# Patient Record
Sex: FEMALE | Race: WHITE | NOT HISPANIC OR LATINO | Employment: UNEMPLOYED | URBAN - METROPOLITAN AREA
[De-identification: names, ages, dates, MRNs, and addresses within clinical notes are randomized per-mention and may not be internally consistent; named-entity substitution may affect disease eponyms.]

---

## 2019-01-28 ENCOUNTER — APPOINTMENT (OUTPATIENT)
Dept: CT IMAGING | Facility: HOSPITAL | Age: 74
End: 2019-01-28

## 2019-01-28 ENCOUNTER — APPOINTMENT (OUTPATIENT)
Dept: GENERAL RADIOLOGY | Facility: HOSPITAL | Age: 74
End: 2019-01-28

## 2019-01-28 ENCOUNTER — HOSPITAL ENCOUNTER (INPATIENT)
Facility: HOSPITAL | Age: 74
LOS: 9 days | Discharge: HOME HEALTH | End: 2019-02-06
Attending: EMERGENCY MEDICINE | Admitting: INTERNAL MEDICINE

## 2019-01-28 DIAGNOSIS — I63.9 ACUTE CVA (CEREBROVASCULAR ACCIDENT) (HCC): Primary | ICD-10-CM

## 2019-01-28 DIAGNOSIS — R26.89 DECREASED MOBILITY: ICD-10-CM

## 2019-01-28 LAB
ABO GROUP BLD: NORMAL
ALBUMIN SERPL-MCNC: 4.2 G/DL (ref 3.5–5.2)
ALBUMIN/GLOB SERPL: 1.8 G/DL
ALP SERPL-CCNC: 78 U/L (ref 39–117)
ALT SERPL W P-5'-P-CCNC: 14 U/L (ref 1–33)
ANION GAP SERPL CALCULATED.3IONS-SCNC: 14.7 MMOL/L
APTT PPP: 26.1 SECONDS (ref 22.7–35.4)
AST SERPL-CCNC: 19 U/L (ref 1–32)
BASOPHILS # BLD AUTO: 0.05 10*3/MM3 (ref 0–0.2)
BASOPHILS NFR BLD AUTO: 0.8 % (ref 0–1.5)
BILIRUB SERPL-MCNC: 0.6 MG/DL (ref 0.1–1.2)
BLD GP AB SCN SERPL QL: NEGATIVE
BUN BLD-MCNC: 13 MG/DL (ref 8–23)
BUN/CREAT SERPL: 14 (ref 7–25)
CALCIUM SPEC-SCNC: 9.4 MG/DL (ref 8.6–10.5)
CHLORIDE SERPL-SCNC: 102 MMOL/L (ref 98–107)
CO2 SERPL-SCNC: 22.3 MMOL/L (ref 22–29)
CREAT BLD-MCNC: 0.93 MG/DL (ref 0.57–1)
DEPRECATED RDW RBC AUTO: 45.6 FL (ref 37–54)
EOSINOPHIL # BLD AUTO: 0.24 10*3/MM3 (ref 0–0.7)
EOSINOPHIL NFR BLD AUTO: 3.7 % (ref 0.3–6.2)
ERYTHROCYTE [DISTWIDTH] IN BLOOD BY AUTOMATED COUNT: 13.2 % (ref 11.7–13)
GFR SERPL CREATININE-BSD FRML MDRD: 59 ML/MIN/1.73
GFR SERPL CREATININE-BSD FRML MDRD: 71 ML/MIN/1.73
GLOBULIN UR ELPH-MCNC: 2.3 GM/DL
GLUCOSE BLD-MCNC: 115 MG/DL (ref 65–99)
GLUCOSE BLDC GLUCOMTR-MCNC: 112 MG/DL (ref 70–130)
GLUCOSE BLDC GLUCOMTR-MCNC: 131 MG/DL (ref 70–130)
GLUCOSE BLDC GLUCOMTR-MCNC: 138 MG/DL (ref 70–130)
HCT VFR BLD AUTO: 45.4 % (ref 35.6–45.5)
HGB BLD-MCNC: 16.1 G/DL (ref 11.9–15.5)
HOLD SPECIMEN: NORMAL
HOLD SPECIMEN: NORMAL
IMM GRANULOCYTES # BLD AUTO: 0.01 10*3/MM3 (ref 0–0.03)
IMM GRANULOCYTES NFR BLD AUTO: 0.2 % (ref 0–0.5)
INR PPP: 1.03 (ref 0.9–1.1)
LYMPHOCYTES # BLD AUTO: 1.71 10*3/MM3 (ref 0.9–4.8)
LYMPHOCYTES NFR BLD AUTO: 26.5 % (ref 19.6–45.3)
MCH RBC QN AUTO: 33.3 PG (ref 26.9–32)
MCHC RBC AUTO-ENTMCNC: 35.5 G/DL (ref 32.4–36.3)
MCV RBC AUTO: 93.8 FL (ref 80.5–98.2)
MONOCYTES # BLD AUTO: 0.88 10*3/MM3 (ref 0.2–1.2)
MONOCYTES NFR BLD AUTO: 13.6 % (ref 5–12)
NEUTROPHILS # BLD AUTO: 3.57 10*3/MM3 (ref 1.9–8.1)
NEUTROPHILS NFR BLD AUTO: 55.4 % (ref 42.7–76)
PLATELET # BLD AUTO: 244 10*3/MM3 (ref 140–500)
PMV BLD AUTO: 9.1 FL (ref 6–12)
POTASSIUM BLD-SCNC: 3.1 MMOL/L (ref 3.5–5.2)
PROT SERPL-MCNC: 6.5 G/DL (ref 6–8.5)
PROTHROMBIN TIME: 13.3 SECONDS (ref 11.7–14.2)
RBC # BLD AUTO: 4.84 10*6/MM3 (ref 3.9–5.2)
RH BLD: POSITIVE
SODIUM BLD-SCNC: 139 MMOL/L (ref 136–145)
T&S EXPIRATION DATE: NORMAL
TROPONIN T SERPL-MCNC: <0.01 NG/ML (ref 0–0.03)
WBC NRBC COR # BLD: 6.45 10*3/MM3 (ref 4.5–10.7)
WHOLE BLOOD HOLD SPECIMEN: NORMAL
WHOLE BLOOD HOLD SPECIMEN: NORMAL

## 2019-01-28 PROCEDURE — 86900 BLOOD TYPING SEROLOGIC ABO: CPT | Performed by: EMERGENCY MEDICINE

## 2019-01-28 PROCEDURE — 93005 ELECTROCARDIOGRAM TRACING: CPT | Performed by: EMERGENCY MEDICINE

## 2019-01-28 PROCEDURE — 93010 ELECTROCARDIOGRAM REPORT: CPT | Performed by: INTERNAL MEDICINE

## 2019-01-28 PROCEDURE — 84484 ASSAY OF TROPONIN QUANT: CPT | Performed by: EMERGENCY MEDICINE

## 2019-01-28 PROCEDURE — 82962 GLUCOSE BLOOD TEST: CPT

## 2019-01-28 PROCEDURE — 99285 EMERGENCY DEPT VISIT HI MDM: CPT

## 2019-01-28 PROCEDURE — 82565 ASSAY OF CREATININE: CPT

## 2019-01-28 PROCEDURE — 85610 PROTHROMBIN TIME: CPT | Performed by: EMERGENCY MEDICINE

## 2019-01-28 PROCEDURE — 85025 COMPLETE CBC W/AUTO DIFF WBC: CPT | Performed by: EMERGENCY MEDICINE

## 2019-01-28 PROCEDURE — 99291 CRITICAL CARE FIRST HOUR: CPT | Performed by: PSYCHIATRY & NEUROLOGY

## 2019-01-28 PROCEDURE — 86850 RBC ANTIBODY SCREEN: CPT | Performed by: EMERGENCY MEDICINE

## 2019-01-28 PROCEDURE — 86901 BLOOD TYPING SEROLOGIC RH(D): CPT | Performed by: EMERGENCY MEDICINE

## 2019-01-28 PROCEDURE — 25010000002 ALTEPLASE PER 1 MG: Performed by: PSYCHIATRY & NEUROLOGY

## 2019-01-28 PROCEDURE — 0042T HC CT CEREBRAL PERFUSION W/WO CONTRAST: CPT

## 2019-01-28 PROCEDURE — 80053 COMPREHEN METABOLIC PANEL: CPT | Performed by: EMERGENCY MEDICINE

## 2019-01-28 PROCEDURE — 71045 X-RAY EXAM CHEST 1 VIEW: CPT

## 2019-01-28 PROCEDURE — 85730 THROMBOPLASTIN TIME PARTIAL: CPT | Performed by: EMERGENCY MEDICINE

## 2019-01-28 PROCEDURE — 70498 CT ANGIOGRAPHY NECK: CPT

## 2019-01-28 PROCEDURE — 3E03317 INTRODUCTION OF OTHER THROMBOLYTIC INTO PERIPHERAL VEIN, PERCUTANEOUS APPROACH: ICD-10-PCS | Performed by: PSYCHIATRY & NEUROLOGY

## 2019-01-28 PROCEDURE — 25010000002 ALTEPLASE PER 1 MG

## 2019-01-28 PROCEDURE — 70496 CT ANGIOGRAPHY HEAD: CPT

## 2019-01-28 PROCEDURE — 0 IOPAMIDOL PER 1 ML: Performed by: EMERGENCY MEDICINE

## 2019-01-28 PROCEDURE — 84443 ASSAY THYROID STIM HORMONE: CPT | Performed by: NURSE PRACTITIONER

## 2019-01-28 RX ORDER — SODIUM CHLORIDE 0.9 % (FLUSH) 0.9 %
10 SYRINGE (ML) INJECTION AS NEEDED
Status: DISCONTINUED | OUTPATIENT
Start: 2019-01-28 | End: 2019-02-06 | Stop reason: HOSPADM

## 2019-01-28 RX ORDER — LEVOTHYROXINE SODIUM 0.12 MG/1
TABLET ORAL DAILY
COMMUNITY

## 2019-01-28 RX ADMIN — WATER 51.8 MG: 1 INJECTION INTRAMUSCULAR; INTRAVENOUS; SUBCUTANEOUS at 20:57

## 2019-01-28 RX ADMIN — IOPAMIDOL 150 ML: 755 INJECTION, SOLUTION INTRAVENOUS at 20:43

## 2019-01-29 ENCOUNTER — APPOINTMENT (OUTPATIENT)
Dept: MRI IMAGING | Facility: HOSPITAL | Age: 74
End: 2019-01-29

## 2019-01-29 ENCOUNTER — APPOINTMENT (OUTPATIENT)
Dept: GENERAL RADIOLOGY | Facility: HOSPITAL | Age: 74
End: 2019-01-29
Attending: PSYCHIATRY & NEUROLOGY

## 2019-01-29 ENCOUNTER — APPOINTMENT (OUTPATIENT)
Dept: CT IMAGING | Facility: HOSPITAL | Age: 74
End: 2019-01-29

## 2019-01-29 LAB
ANION GAP SERPL CALCULATED.3IONS-SCNC: 13.2 MMOL/L
BUN BLD-MCNC: 8 MG/DL (ref 8–23)
BUN/CREAT SERPL: 12.9 (ref 7–25)
CALCIUM SPEC-SCNC: 8.7 MG/DL (ref 8.6–10.5)
CHLORIDE SERPL-SCNC: 106 MMOL/L (ref 98–107)
CO2 SERPL-SCNC: 19.8 MMOL/L (ref 22–29)
CREAT BLD-MCNC: 0.62 MG/DL (ref 0.57–1)
GFR SERPL CREATININE-BSD FRML MDRD: 94 ML/MIN/1.73
GLUCOSE BLD-MCNC: 95 MG/DL (ref 65–99)
GLUCOSE BLDC GLUCOMTR-MCNC: 114 MG/DL (ref 70–130)
GLUCOSE BLDC GLUCOMTR-MCNC: 94 MG/DL (ref 70–130)
GLUCOSE BLDC GLUCOMTR-MCNC: 97 MG/DL (ref 70–130)
POTASSIUM BLD-SCNC: 3.5 MMOL/L (ref 3.5–5.2)
SODIUM BLD-SCNC: 139 MMOL/L (ref 136–145)
TSH SERPL DL<=0.05 MIU/L-ACNC: 1.12 MIU/ML (ref 0.27–4.2)
VIT B12 BLD-MCNC: 240 PG/ML (ref 211–946)

## 2019-01-29 PROCEDURE — 80048 BASIC METABOLIC PNL TOTAL CA: CPT | Performed by: INTERNAL MEDICINE

## 2019-01-29 PROCEDURE — 82607 VITAMIN B-12: CPT | Performed by: NURSE PRACTITIONER

## 2019-01-29 PROCEDURE — 70450 CT HEAD/BRAIN W/O DYE: CPT

## 2019-01-29 PROCEDURE — 82962 GLUCOSE BLOOD TEST: CPT

## 2019-01-29 PROCEDURE — 70551 MRI BRAIN STEM W/O DYE: CPT

## 2019-01-29 PROCEDURE — 99233 SBSQ HOSP IP/OBS HIGH 50: CPT | Performed by: NURSE PRACTITIONER

## 2019-01-29 PROCEDURE — 25010000003 POTASSIUM CHLORIDE 10 MEQ/100ML SOLUTION: Performed by: INTERNAL MEDICINE

## 2019-01-29 PROCEDURE — 72020 X-RAY EXAM OF SPINE 1 VIEW: CPT

## 2019-01-29 PROCEDURE — 92610 EVALUATE SWALLOWING FUNCTION: CPT | Performed by: SPEECH-LANGUAGE PATHOLOGIST

## 2019-01-29 RX ORDER — SODIUM CHLORIDE 0.9 % (FLUSH) 0.9 %
3 SYRINGE (ML) INJECTION EVERY 12 HOURS SCHEDULED
Status: DISCONTINUED | OUTPATIENT
Start: 2019-01-29 | End: 2019-01-30

## 2019-01-29 RX ORDER — ASPIRIN 325 MG
325 TABLET ORAL DAILY
Status: DISCONTINUED | OUTPATIENT
Start: 2019-01-30 | End: 2019-01-30

## 2019-01-29 RX ORDER — SODIUM CHLORIDE 9 MG/ML
75 INJECTION, SOLUTION INTRAVENOUS CONTINUOUS
Status: DISCONTINUED | OUTPATIENT
Start: 2019-01-29 | End: 2019-02-02

## 2019-01-29 RX ORDER — POTASSIUM CHLORIDE 7.45 MG/ML
10 INJECTION INTRAVENOUS
Status: DISCONTINUED | OUTPATIENT
Start: 2019-01-29 | End: 2019-02-06 | Stop reason: HOSPADM

## 2019-01-29 RX ORDER — SODIUM CHLORIDE 0.9 % (FLUSH) 0.9 %
3-10 SYRINGE (ML) INJECTION AS NEEDED
Status: DISCONTINUED | OUTPATIENT
Start: 2019-01-29 | End: 2019-02-06 | Stop reason: HOSPADM

## 2019-01-29 RX ORDER — ASPIRIN 300 MG/1
300 SUPPOSITORY RECTAL DAILY
Status: DISCONTINUED | OUTPATIENT
Start: 2019-01-30 | End: 2019-01-30

## 2019-01-29 RX ORDER — ATORVASTATIN CALCIUM 80 MG/1
80 TABLET, FILM COATED ORAL NIGHTLY
Status: DISCONTINUED | OUTPATIENT
Start: 2019-01-29 | End: 2019-01-30

## 2019-01-29 RX ORDER — NICOTINE 21 MG/24HR
1 PATCH, TRANSDERMAL 24 HOURS TRANSDERMAL
Status: DISCONTINUED | OUTPATIENT
Start: 2019-01-29 | End: 2019-02-06 | Stop reason: HOSPADM

## 2019-01-29 RX ADMIN — POTASSIUM CHLORIDE 10 MEQ: 7.46 INJECTION, SOLUTION INTRAVENOUS at 02:48

## 2019-01-29 RX ADMIN — NICOTINE 1 PATCH: 21 PATCH, EXTENDED RELEASE TRANSDERMAL at 22:08

## 2019-01-29 RX ADMIN — SODIUM CHLORIDE 5 MG/HR: 9 INJECTION, SOLUTION INTRAVENOUS at 01:12

## 2019-01-29 RX ADMIN — POTASSIUM CHLORIDE 10 MEQ: 7.46 INJECTION, SOLUTION INTRAVENOUS at 05:23

## 2019-01-29 RX ADMIN — POTASSIUM CHLORIDE 10 MEQ: 7.46 INJECTION, SOLUTION INTRAVENOUS at 04:01

## 2019-01-29 RX ADMIN — Medication 3 ML: at 09:59

## 2019-01-29 RX ADMIN — POTASSIUM CHLORIDE 10 MEQ: 7.46 INJECTION, SOLUTION INTRAVENOUS at 00:39

## 2019-01-29 RX ADMIN — POTASSIUM CHLORIDE 10 MEQ: 7.46 INJECTION, SOLUTION INTRAVENOUS at 06:36

## 2019-01-29 RX ADMIN — POTASSIUM CHLORIDE 10 MEQ: 7.46 INJECTION, SOLUTION INTRAVENOUS at 08:06

## 2019-01-29 RX ADMIN — SODIUM CHLORIDE 75 ML/HR: 9 INJECTION, SOLUTION INTRAVENOUS at 11:35

## 2019-01-29 RX ADMIN — Medication 3 ML: at 20:57

## 2019-01-29 RX ADMIN — SODIUM CHLORIDE 75 ML/HR: 9 INJECTION, SOLUTION INTRAVENOUS at 06:55

## 2019-01-30 ENCOUNTER — APPOINTMENT (OUTPATIENT)
Dept: CARDIOLOGY | Facility: HOSPITAL | Age: 74
End: 2019-01-30

## 2019-01-30 LAB
BH CV ECHO MEAS - ACS: 1.9 CM
BH CV ECHO MEAS - AO MEAN PG (FULL): 0 MMHG
BH CV ECHO MEAS - AO MEAN PG: 3 MMHG
BH CV ECHO MEAS - AO ROOT AREA (BSA CORRECTED): 1.6
BH CV ECHO MEAS - AO ROOT AREA: 6.6 CM^2
BH CV ECHO MEAS - AO ROOT DIAM: 2.9 CM
BH CV ECHO MEAS - AO V2 MEAN: 74.4 CM/SEC
BH CV ECHO MEAS - AO V2 VTI: 23.3 CM
BH CV ECHO MEAS - AVA(I,A): 4.3 CM^2
BH CV ECHO MEAS - AVA(I,D): 4.3 CM^2
BH CV ECHO MEAS - BSA(HAYCOCK): 1.8 M^2
BH CV ECHO MEAS - BSA: 1.8 M^2
BH CV ECHO MEAS - BZI_BMI: 20.2 KILOGRAMS/M^2
BH CV ECHO MEAS - BZI_METRIC_HEIGHT: 177.8 CM
BH CV ECHO MEAS - BZI_METRIC_WEIGHT: 64 KG
BH CV ECHO MEAS - EDV(CUBED): 46.7 ML
BH CV ECHO MEAS - EDV(MOD-SP2): 50 ML
BH CV ECHO MEAS - EDV(MOD-SP4): 59 ML
BH CV ECHO MEAS - EDV(TEICH): 54.4 ML
BH CV ECHO MEAS - EF(CUBED): 66.5 %
BH CV ECHO MEAS - EF(MOD-BP): 65 %
BH CV ECHO MEAS - EF(MOD-SP2): 68 %
BH CV ECHO MEAS - EF(MOD-SP4): 64.4 %
BH CV ECHO MEAS - EF(TEICH): 59 %
BH CV ECHO MEAS - ESV(CUBED): 15.6 ML
BH CV ECHO MEAS - ESV(MOD-SP2): 16 ML
BH CV ECHO MEAS - ESV(MOD-SP4): 21 ML
BH CV ECHO MEAS - ESV(TEICH): 22.3 ML
BH CV ECHO MEAS - FS: 30.6 %
BH CV ECHO MEAS - IVS/LVPW: 1
BH CV ECHO MEAS - IVSD: 1.3 CM
BH CV ECHO MEAS - LA DIMENSION: 4.3 CM
BH CV ECHO MEAS - LA/AO: 1.5
BH CV ECHO MEAS - LAT PEAK E' VEL: 7 CM/SEC
BH CV ECHO MEAS - LV DIASTOLIC VOL/BSA (35-75): 32.8 ML/M^2
BH CV ECHO MEAS - LV MASS(C)D: 160.1 GRAMS
BH CV ECHO MEAS - LV MASS(C)DI: 89 GRAMS/M^2
BH CV ECHO MEAS - LV MEAN PG: 3 MMHG
BH CV ECHO MEAS - LV SYSTOLIC VOL/BSA (12-30): 11.7 ML/M^2
BH CV ECHO MEAS - LV V1 MEAN: 79.9 CM/SEC
BH CV ECHO MEAS - LV V1 VTI: 28.9 CM
BH CV ECHO MEAS - LVIDD: 3.6 CM
BH CV ECHO MEAS - LVIDS: 2.5 CM
BH CV ECHO MEAS - LVLD AP2: 7.3 CM
BH CV ECHO MEAS - LVLD AP4: 7.4 CM
BH CV ECHO MEAS - LVLS AP2: 5.5 CM
BH CV ECHO MEAS - LVLS AP4: 6 CM
BH CV ECHO MEAS - LVOT AREA (M): 3.5 CM^2
BH CV ECHO MEAS - LVOT AREA: 3.5 CM^2
BH CV ECHO MEAS - LVOT DIAM: 2.1 CM
BH CV ECHO MEAS - LVPWD: 1.3 CM
BH CV ECHO MEAS - MED PEAK E' VEL: 6.5 CM/SEC
BH CV ECHO MEAS - MV A DUR: 0.21 SEC
BH CV ECHO MEAS - MV A MAX VEL: 130 CM/SEC
BH CV ECHO MEAS - MV DEC SLOPE: 238 CM/SEC^2
BH CV ECHO MEAS - MV DEC TIME: 0.22 SEC
BH CV ECHO MEAS - MV E MAX VEL: 81.9 CM/SEC
BH CV ECHO MEAS - MV E/A: 0.63
BH CV ECHO MEAS - MV MEAN PG: 3 MMHG
BH CV ECHO MEAS - MV P1/2T MAX VEL: 93.7 CM/SEC
BH CV ECHO MEAS - MV P1/2T: 115.3 MSEC
BH CV ECHO MEAS - MV V2 MEAN: 80.7 CM/SEC
BH CV ECHO MEAS - MV V2 VTI: 31.9 CM
BH CV ECHO MEAS - MVA P1/2T LCG: 2.3 CM^2
BH CV ECHO MEAS - MVA(P1/2T): 1.9 CM^2
BH CV ECHO MEAS - MVA(VTI): 3.1 CM^2
BH CV ECHO MEAS - PA ACC SLOPE: 694 CM/SEC^2
BH CV ECHO MEAS - PA ACC TIME: 0.12 SEC
BH CV ECHO MEAS - PA MAX PG (FULL): 1.5 MMHG
BH CV ECHO MEAS - PA MAX PG: 2.8 MMHG
BH CV ECHO MEAS - PA PR(ACCEL): 23.7 MMHG
BH CV ECHO MEAS - PA V2 MAX: 84.2 CM/SEC
BH CV ECHO MEAS - PULM A REVS DUR: 0.17 SEC
BH CV ECHO MEAS - PULM A REVS VEL: 26.7 CM/SEC
BH CV ECHO MEAS - PULM DIAS VEL: 32.6 CM/SEC
BH CV ECHO MEAS - PULM S/D: 1.7
BH CV ECHO MEAS - PULM SYS VEL: 55.8 CM/SEC
BH CV ECHO MEAS - PVA(V,A): 2.3 CM^2
BH CV ECHO MEAS - PVA(V,D): 2.3 CM^2
BH CV ECHO MEAS - QP/QS: 0.42
BH CV ECHO MEAS - RAP SYSTOLE: 3 MMHG
BH CV ECHO MEAS - RV MAX PG: 1.3 MMHG
BH CV ECHO MEAS - RV MEAN PG: 1 MMHG
BH CV ECHO MEAS - RV V1 MAX: 57.1 CM/SEC
BH CV ECHO MEAS - RV V1 MEAN: 41.9 CM/SEC
BH CV ECHO MEAS - RV V1 VTI: 12.1 CM
BH CV ECHO MEAS - RVOT AREA: 3.5 CM^2
BH CV ECHO MEAS - RVOT DIAM: 2.1 CM
BH CV ECHO MEAS - RVSP: 20 MMHG
BH CV ECHO MEAS - SI(AO): 85.5 ML/M^2
BH CV ECHO MEAS - SI(CUBED): 17.2 ML/M^2
BH CV ECHO MEAS - SI(LVOT): 55.6 ML/M^2
BH CV ECHO MEAS - SI(MOD-SP2): 18.9 ML/M^2
BH CV ECHO MEAS - SI(MOD-SP4): 21.1 ML/M^2
BH CV ECHO MEAS - SI(TEICH): 17.8 ML/M^2
BH CV ECHO MEAS - SV(AO): 153.9 ML
BH CV ECHO MEAS - SV(CUBED): 31 ML
BH CV ECHO MEAS - SV(LVOT): 100.1 ML
BH CV ECHO MEAS - SV(MOD-SP2): 34 ML
BH CV ECHO MEAS - SV(MOD-SP4): 38 ML
BH CV ECHO MEAS - SV(RVOT): 41.9 ML
BH CV ECHO MEAS - SV(TEICH): 32.1 ML
BH CV ECHO MEAS - TAPSE (>1.6): 2.1 CM2
BH CV ECHO MEAS - TR MAX VEL: 208 CM/SEC
BH CV ECHO MEASUREMENTS AVERAGE E/E' RATIO: 12.13
BH CV XLRA - RV BASE: 3.4 CM
BH CV XLRA - RV LENGTH: 6.2 CM
BH CV XLRA - RV MID: 2.6 CM
BH CV XLRA - TDI S': 11.3 CM/SEC
CHOLEST SERPL-MCNC: 181 MG/DL (ref 0–200)
FOLATE SERPL-MCNC: 18.61 NG/ML (ref 4.78–24.2)
GLUCOSE BLDC GLUCOMTR-MCNC: 106 MG/DL (ref 70–130)
HBA1C MFR BLD: 5.01 % (ref 4.8–5.6)
HDLC SERPL-MCNC: 71 MG/DL (ref 40–60)
LDLC SERPL CALC-MCNC: 88 MG/DL (ref 0–100)
LDLC/HDLC SERPL: 1.24 {RATIO}
LEFT ATRIUM VOLUME INDEX: 23 ML/M2
LV EF 2D ECHO EST: 65 %
MAXIMAL PREDICTED HEART RATE: 146 BPM
STRESS TARGET HR: 124 BPM
TRIGL SERPL-MCNC: 111 MG/DL (ref 0–150)
VLDLC SERPL-MCNC: 22.2 MG/DL (ref 5–40)

## 2019-01-30 PROCEDURE — 97110 THERAPEUTIC EXERCISES: CPT

## 2019-01-30 PROCEDURE — 80061 LIPID PANEL: CPT | Performed by: PSYCHIATRY & NEUROLOGY

## 2019-01-30 PROCEDURE — 93306 TTE W/DOPPLER COMPLETE: CPT

## 2019-01-30 PROCEDURE — 99233 SBSQ HOSP IP/OBS HIGH 50: CPT | Performed by: NURSE PRACTITIONER

## 2019-01-30 PROCEDURE — 25010000003 POTASSIUM CHLORIDE 10 MEQ/100ML SOLUTION: Performed by: INTERNAL MEDICINE

## 2019-01-30 PROCEDURE — 82962 GLUCOSE BLOOD TEST: CPT

## 2019-01-30 PROCEDURE — 93306 TTE W/DOPPLER COMPLETE: CPT | Performed by: INTERNAL MEDICINE

## 2019-01-30 PROCEDURE — 25010000002 THIAMINE PER 100 MG: Performed by: NURSE PRACTITIONER

## 2019-01-30 PROCEDURE — 83036 HEMOGLOBIN GLYCOSYLATED A1C: CPT | Performed by: PSYCHIATRY & NEUROLOGY

## 2019-01-30 PROCEDURE — 97162 PT EVAL MOD COMPLEX 30 MIN: CPT

## 2019-01-30 PROCEDURE — 82746 ASSAY OF FOLIC ACID SERUM: CPT | Performed by: NURSE PRACTITIONER

## 2019-01-30 PROCEDURE — 92526 ORAL FUNCTION THERAPY: CPT

## 2019-01-30 PROCEDURE — 25010000002 CYANOCOBALAMIN PER 1000 MCG: Performed by: NURSE PRACTITIONER

## 2019-01-30 RX ORDER — DIPHENOXYLATE HYDROCHLORIDE AND ATROPINE SULFATE 2.5; .025 MG/1; MG/1
1 TABLET ORAL DAILY
Status: DISCONTINUED | OUTPATIENT
Start: 2019-01-30 | End: 2019-02-06 | Stop reason: HOSPADM

## 2019-01-30 RX ORDER — CYANOCOBALAMIN 1000 UG/ML
1000 INJECTION, SOLUTION INTRAMUSCULAR; SUBCUTANEOUS
Status: DISCONTINUED | OUTPATIENT
Start: 2019-01-30 | End: 2019-01-30

## 2019-01-30 RX ORDER — CLOPIDOGREL BISULFATE 75 MG/1
75 TABLET ORAL DAILY
Status: DISCONTINUED | OUTPATIENT
Start: 2019-01-30 | End: 2019-02-06 | Stop reason: HOSPADM

## 2019-01-30 RX ORDER — ATORVASTATIN CALCIUM 20 MG/1
40 TABLET, FILM COATED ORAL NIGHTLY
Status: DISCONTINUED | OUTPATIENT
Start: 2019-01-30 | End: 2019-02-06 | Stop reason: HOSPADM

## 2019-01-30 RX ORDER — ASPIRIN 300 MG/1
300 SUPPOSITORY RECTAL EVERY 24 HOURS
Status: DISCONTINUED | OUTPATIENT
Start: 2019-01-30 | End: 2019-02-06 | Stop reason: HOSPADM

## 2019-01-30 RX ORDER — CHOLECALCIFEROL (VITAMIN D3) 125 MCG
1000 CAPSULE ORAL DAILY
Status: DISCONTINUED | OUTPATIENT
Start: 2019-02-04 | End: 2019-02-06 | Stop reason: HOSPADM

## 2019-01-30 RX ORDER — CYANOCOBALAMIN 1000 UG/ML
1000 INJECTION, SOLUTION INTRAMUSCULAR; SUBCUTANEOUS DAILY
Status: COMPLETED | OUTPATIENT
Start: 2019-01-30 | End: 2019-02-03

## 2019-01-30 RX ORDER — ASPIRIN 81 MG/1
81 TABLET, CHEWABLE ORAL DAILY
Status: DISCONTINUED | OUTPATIENT
Start: 2019-01-30 | End: 2019-02-06 | Stop reason: HOSPADM

## 2019-01-30 RX ADMIN — ATORVASTATIN CALCIUM 40 MG: 20 TABLET, FILM COATED ORAL at 21:30

## 2019-01-30 RX ADMIN — ASPIRIN 81 MG: 81 TABLET, CHEWABLE ORAL at 11:53

## 2019-01-30 RX ADMIN — Medication 3 ML: at 09:23

## 2019-01-30 RX ADMIN — CLOPIDOGREL 75 MG: 75 TABLET, FILM COATED ORAL at 11:52

## 2019-01-30 RX ADMIN — POTASSIUM CHLORIDE 10 MEQ: 7.46 INJECTION, SOLUTION INTRAVENOUS at 06:25

## 2019-01-30 RX ADMIN — THIAMINE HYDROCHLORIDE 300 MG: 100 INJECTION, SOLUTION INTRAMUSCULAR; INTRAVENOUS at 10:31

## 2019-01-30 RX ADMIN — SODIUM CHLORIDE 75 ML/HR: 9 INJECTION, SOLUTION INTRAVENOUS at 18:13

## 2019-01-30 RX ADMIN — NICOTINE 1 PATCH: 21 PATCH, EXTENDED RELEASE TRANSDERMAL at 10:28

## 2019-01-30 RX ADMIN — Medication 1 TABLET: at 16:19

## 2019-01-30 RX ADMIN — CYANOCOBALAMIN 1000 MCG: 1000 INJECTION, SOLUTION INTRAMUSCULAR; SUBCUTANEOUS at 11:53

## 2019-01-31 ENCOUNTER — APPOINTMENT (OUTPATIENT)
Dept: GENERAL RADIOLOGY | Facility: HOSPITAL | Age: 74
End: 2019-01-31

## 2019-01-31 LAB
ANION GAP SERPL CALCULATED.3IONS-SCNC: 13.5 MMOL/L
BUN BLD-MCNC: 7 MG/DL (ref 8–23)
BUN/CREAT SERPL: 12.7 (ref 7–25)
CALCIUM SPEC-SCNC: 8.6 MG/DL (ref 8.6–10.5)
CHLORIDE SERPL-SCNC: 105 MMOL/L (ref 98–107)
CO2 SERPL-SCNC: 20.5 MMOL/L (ref 22–29)
CREAT BLD-MCNC: 0.55 MG/DL (ref 0.57–1)
GFR SERPL CREATININE-BSD FRML MDRD: 108 ML/MIN/1.73
GLUCOSE BLD-MCNC: 101 MG/DL (ref 65–99)
GLUCOSE BLDC GLUCOMTR-MCNC: 103 MG/DL (ref 70–130)
POTASSIUM BLD-SCNC: 3.6 MMOL/L (ref 3.5–5.2)
SODIUM BLD-SCNC: 139 MMOL/L (ref 136–145)

## 2019-01-31 PROCEDURE — 25010000002 LORAZEPAM PER 2 MG

## 2019-01-31 PROCEDURE — 97166 OT EVAL MOD COMPLEX 45 MIN: CPT

## 2019-01-31 PROCEDURE — 80048 BASIC METABOLIC PNL TOTAL CA: CPT | Performed by: INTERNAL MEDICINE

## 2019-01-31 PROCEDURE — 74230 X-RAY XM SWLNG FUNCJ C+: CPT

## 2019-01-31 PROCEDURE — 25010000002 CYANOCOBALAMIN PER 1000 MCG: Performed by: NURSE PRACTITIONER

## 2019-01-31 PROCEDURE — 92611 MOTION FLUOROSCOPY/SWALLOW: CPT

## 2019-01-31 PROCEDURE — 25010000002 THIAMINE PER 100 MG: Performed by: NURSE PRACTITIONER

## 2019-01-31 PROCEDURE — 82962 GLUCOSE BLOOD TEST: CPT

## 2019-01-31 PROCEDURE — 97110 THERAPEUTIC EXERCISES: CPT

## 2019-01-31 PROCEDURE — 25010000002 LORAZEPAM PER 2 MG: Performed by: INTERNAL MEDICINE

## 2019-01-31 PROCEDURE — 99232 SBSQ HOSP IP/OBS MODERATE 35: CPT | Performed by: NURSE PRACTITIONER

## 2019-01-31 PROCEDURE — 97530 THERAPEUTIC ACTIVITIES: CPT

## 2019-01-31 RX ORDER — LEVOTHYROXINE SODIUM 0.12 MG/1
125 TABLET ORAL DAILY
Status: DISCONTINUED | OUTPATIENT
Start: 2019-01-31 | End: 2019-02-06 | Stop reason: HOSPADM

## 2019-01-31 RX ORDER — LORAZEPAM 2 MG/ML
2 INJECTION INTRAMUSCULAR EVERY 4 HOURS PRN
Status: DISCONTINUED | OUTPATIENT
Start: 2019-01-31 | End: 2019-02-06 | Stop reason: HOSPADM

## 2019-01-31 RX ORDER — CHOLECALCIFEROL (VITAMIN D3) 125 MCG
5 CAPSULE ORAL NIGHTLY PRN
Status: DISCONTINUED | OUTPATIENT
Start: 2019-01-31 | End: 2019-02-06 | Stop reason: HOSPADM

## 2019-01-31 RX ORDER — LORAZEPAM 2 MG/ML
INJECTION INTRAMUSCULAR
Status: COMPLETED
Start: 2019-01-31 | End: 2019-01-31

## 2019-01-31 RX ORDER — ACETAMINOPHEN 500 MG
500 TABLET ORAL EVERY 6 HOURS PRN
Status: DISCONTINUED | OUTPATIENT
Start: 2019-01-31 | End: 2019-02-06 | Stop reason: HOSPADM

## 2019-01-31 RX ADMIN — BARIUM SULFATE 65 ML: 960 POWDER, FOR SUSPENSION ORAL at 08:39

## 2019-01-31 RX ADMIN — BARIUM SULFATE 50 ML: 400 SUSPENSION ORAL at 08:40

## 2019-01-31 RX ADMIN — CYANOCOBALAMIN 1000 MCG: 1000 INJECTION, SOLUTION INTRAMUSCULAR; SUBCUTANEOUS at 09:17

## 2019-01-31 RX ADMIN — ATORVASTATIN CALCIUM 40 MG: 20 TABLET, FILM COATED ORAL at 20:45

## 2019-01-31 RX ADMIN — LEVOTHYROXINE SODIUM 125 MCG: 125 TABLET ORAL at 14:49

## 2019-01-31 RX ADMIN — NICOTINE 1 PATCH: 21 PATCH, EXTENDED RELEASE TRANSDERMAL at 09:19

## 2019-01-31 RX ADMIN — Medication 1 TABLET: at 09:17

## 2019-01-31 RX ADMIN — ACETAMINOPHEN 500 MG: 500 TABLET, FILM COATED ORAL at 22:52

## 2019-01-31 RX ADMIN — LORAZEPAM 2 MG: 2 INJECTION INTRAMUSCULAR; INTRAVENOUS at 00:42

## 2019-01-31 RX ADMIN — LORAZEPAM 2 MG: 2 INJECTION INTRAMUSCULAR; INTRAVENOUS at 22:53

## 2019-01-31 RX ADMIN — ASPIRIN 81 MG: 81 TABLET, CHEWABLE ORAL at 09:17

## 2019-01-31 RX ADMIN — SODIUM CHLORIDE 75 ML/HR: 9 INJECTION, SOLUTION INTRAVENOUS at 18:47

## 2019-01-31 RX ADMIN — CLOPIDOGREL 75 MG: 75 TABLET, FILM COATED ORAL at 09:17

## 2019-01-31 RX ADMIN — BARIUM SULFATE 4 ML: 980 POWDER, FOR SUSPENSION ORAL at 08:40

## 2019-01-31 RX ADMIN — THIAMINE HYDROCHLORIDE 300 MG: 100 INJECTION, SOLUTION INTRAMUSCULAR; INTRAVENOUS at 09:17

## 2019-02-01 PROCEDURE — 25010000002 CYANOCOBALAMIN PER 1000 MCG: Performed by: NURSE PRACTITIONER

## 2019-02-01 PROCEDURE — 97110 THERAPEUTIC EXERCISES: CPT | Performed by: PHYSICAL THERAPIST

## 2019-02-01 PROCEDURE — 25010000002 THIAMINE PER 100 MG: Performed by: NURSE PRACTITIONER

## 2019-02-01 PROCEDURE — 92526 ORAL FUNCTION THERAPY: CPT | Performed by: SPEECH-LANGUAGE PATHOLOGIST

## 2019-02-01 PROCEDURE — 97530 THERAPEUTIC ACTIVITIES: CPT

## 2019-02-01 PROCEDURE — 25010000002 LORAZEPAM PER 2 MG: Performed by: INTERNAL MEDICINE

## 2019-02-01 RX ADMIN — ATORVASTATIN CALCIUM 40 MG: 20 TABLET, FILM COATED ORAL at 20:28

## 2019-02-01 RX ADMIN — ASPIRIN 81 MG: 81 TABLET, CHEWABLE ORAL at 10:10

## 2019-02-01 RX ADMIN — LORAZEPAM 2 MG: 2 INJECTION INTRAMUSCULAR; INTRAVENOUS at 21:36

## 2019-02-01 RX ADMIN — LEVOTHYROXINE SODIUM 125 MCG: 125 TABLET ORAL at 10:10

## 2019-02-01 RX ADMIN — Medication 1 TABLET: at 10:10

## 2019-02-01 RX ADMIN — CYANOCOBALAMIN 1000 MCG: 1000 INJECTION, SOLUTION INTRAMUSCULAR; SUBCUTANEOUS at 10:10

## 2019-02-01 RX ADMIN — CLOPIDOGREL 75 MG: 75 TABLET, FILM COATED ORAL at 10:10

## 2019-02-01 RX ADMIN — NICOTINE 1 PATCH: 21 PATCH, EXTENDED RELEASE TRANSDERMAL at 10:11

## 2019-02-01 RX ADMIN — THIAMINE HYDROCHLORIDE 300 MG: 100 INJECTION, SOLUTION INTRAMUSCULAR; INTRAVENOUS at 10:10

## 2019-02-01 NOTE — PLAN OF CARE
Problem: Fall Risk (Adult)  Goal: Identify Related Risk Factors and Signs and Symptoms  Outcome: Outcome(s) achieved Date Met: 02/01/19    Goal: Absence of Fall  Outcome: Ongoing (interventions implemented as appropriate)      Problem: Skin Injury Risk (Adult)  Goal: Identify Related Risk Factors and Signs and Symptoms  Outcome: Outcome(s) achieved Date Met: 02/01/19    Goal: Skin Health and Integrity  Outcome: Ongoing (interventions implemented as appropriate)      Problem: Patient Care Overview  Goal: Plan of Care Review  Outcome: Ongoing (interventions implemented as appropriate)   02/01/19 0289   Plan of Care Review   Progress improving   OTHER   Outcome Summary Pt alert and able to turn self, right right weak, NIH 5, skilled OT to increase safety and indendence, VSS, will CTM   Coping/Psychosocial   Plan of Care Reviewed With patient       Problem: Stroke (Ischemic) (Adult)  Goal: Signs and Symptoms of Listed Potential Problems Will be Absent, Minimized or Managed (Stroke)  Outcome: Ongoing (interventions implemented as appropriate)      Problem: Pain, Acute (Adult)  Goal: Identify Related Risk Factors and Signs and Symptoms  Outcome: Outcome(s) achieved Date Met: 02/01/19    Goal: Acceptable Pain Control/Comfort Level  Outcome: Ongoing (interventions implemented as appropriate)      Problem: Dysphagia (Adult)  Goal: Identify Related Risk Factors and Signs and Symptoms  Outcome: Outcome(s) achieved Date Met: 02/01/19    Goal: Functional/Safe Swallow  Outcome: Ongoing (interventions implemented as appropriate)    Goal: Compensatory Techniques to Improve Safety/Function with Swallowing  Outcome: Ongoing (interventions implemented as appropriate)

## 2019-02-01 NOTE — PLAN OF CARE
Problem: Patient Care Overview  Goal: Plan of Care Review  Outcome: Ongoing (interventions implemented as appropriate)   02/01/19 1004   Plan of Care Review   Progress improving   OTHER   Outcome Summary Patient able to increase ambulation distance however continues to buckle in R LE once fatigued. Patient was motivated to walk even further however encouraged her to turn around in order to avoid a fall since she became increasingly unsteady with distance   Coping/Psychosocial   Plan of Care Reviewed With patient

## 2019-02-01 NOTE — PROGRESS NOTES
Continued Stay Note  [facility]     Patient Name: Sommer Smith  MRN: [id number]  Today's Date: 2/1/2019    Admit Date: 1/28/2019    Discharge Plan     Row Name 02/01/19 1534       Plan    Plan  Skilled rehab vs  Acute.  Family and pt have decisions to make on post rehab care and resources.  [facility] will follow up on Monday (pt will need precert with either decision)    Plan Comments  [facility] spoke with pt / dtr, Gunjan and son Phillip @ bedside to discuss acute vs snu.  Pt feels she can make her own decision regarding rehab, and does not want her children to pick for her.  Liz /  Acute rehab was also in room with [facility] to discuss options.  Pt not sure she would want acute due to copay of acute, and family is not sure who could be with pt 24/7 after dc.   [facility] discussed skilled options, and gave facility list and RTR, asked them to consider 2 choices as back up if they decide not to do acute.  [facility] was later approached by dtr, that pt has decided against acute due to cost, and that they would like [facility] or [facility].  Call to [facility], and [facility] cannot accept pvt insurance @ this time.  She will contact [facility] and have them check benefits. The financial responsibility of skilled, may make pt was acute again.  [facility] will follow with pt/ family on cost and get decision on Monday(pt will need precert with either decision).  Melissa Linares RN        Discharge Codes    No documentation.             Melissa Linares RN

## 2019-02-01 NOTE — THERAPY TREATMENT NOTE
Acute Care - Physical Therapy Treatment Note  [facility]     Patient Name: Sommer Smith  : 1945  MRN: [id number]  Today's Date: 2019        Referring Physician: Blossom    Admit Date: 2019    Visit Dx:    ICD-10-CM ICD-9-CM   1. Acute CVA (cerebrovascular accident) (CMS/HCC) I63.9 434.91   2. Decreased mobility R26.89 781.99     Patient Active Problem List   Diagnosis   • Acute CVA (cerebrovascular accident) (CMS/HCC)       Therapy Treatment    Rehabilitation Treatment Summary     Row Name 19 1004             Treatment Time/Intention    Discipline  physical therapist  -KH      Document Type  therapy note (daily note)  -KH      Subjective Information  no complaints  -KH      Mode of Treatment  physical therapy;individual therapy  -KH      Patient/Family Observations  patient laying in bed, daughter present  -KH      Therapy Frequency (PT Clinical Impression)  daily  -KH      Patient Effort  good  -KH      Existing Precautions/Restrictions  fall  (Significant)  [place]  -KH      Recorded by [KH] Emma Carvalho, PT 19 1012      Row Name 19 1004             Vital Signs    O2 Delivery Pre Treatment  room air  -KH      Recorded by [KH] Emma Carvalho, PT 19 1012      Row Name 19 1004             Cognitive Assessment/Intervention- PT/OT    Orientation Status (Cognition)  oriented x 4  -KH      Follows Commands (Cognition)  follows one step commands;75-90% accuracy  -KH      Safety Deficit (Cognitive)  mild deficit  -KH      Personal Safety Interventions  fall prevention program maintained;gait belt;nonskid shoes/slippers when out of bed;supervised activity  -KH      Recorded by [KH] Emma Carvalho, PT 19 1012      Row Name 19 1004             Bed Mobility Assessment/Treatment    Bed Mobility Assessment/Treatment  supine-sit;sit-supine  -KH      Supine-Sit [place] (Bed Mobility)  minimum assist (75% patient effort)  -KH      Bed Mobility, Safety Issues  decreased use  of arms for pushing/pulling;decreased use of legs for bridging/pushing  -KH      Assistive Device (Bed Mobility)  bed rails;head of bed elevated  -KH      Recorded by [KH] Emma Carvalho, PT 02/01/19 1012      Row Name 02/01/19 1004             Transfer Assessment/Treatment    Transfer Assessment/Treatment  sit-stand transfer;stand-sit transfer  -KH      Recorded by [KH] Emma Carvalho, PT 02/01/19 1012      Row Name 02/01/19 1004             Sit-Stand Transfer    Sit-Stand [place] (Transfers)  moderate assist (50% patient effort);2 person assist  -KH      Assistive Device (Sit-Stand Transfers)  walker, front-wheeled  -KH      Recorded by [KH] Emma Carvalho, PT 02/01/19 1012      Row Name 02/01/19 1004             Stand-Sit Transfer    Stand-Sit [place] (Transfers)  minimum assist (75% patient effort)  -KH      Assistive Device (Stand-Sit Transfers)  walker, front-wheeled  -KH      Recorded by [KH] Emma Carvalho, PT 02/01/19 1012      Row Name 02/01/19 1004             Gait/Stairs Assessment/Training    [place] Level (Gait)  moderate assist (50% patient effort);2 person assist  -KH      Distance in Feet (Gait)  30  -KH      Pattern (Gait)  step-to  -KH      Deviations/Abnormal Patterns (Gait)  right sided deviations  -KH      Right Sided Gait Deviations  heel strike decreased;knee buckling, right side;weight shift ability decreased  -KH      Recorded by [KH] Emma Carvalho, PT 02/01/19 1012      Row Name 02/01/19 1004             Static Sitting Balance    Level of [place] (Unsupported Sitting, Static Balance)  supervision  -KH      Sitting Position (Unsupported Sitting, Static Balance)  sitting on edge of bed  -KH      Time Able to Maintain Position (Unsupported Sitting, Static Balance)  2 to 3 minutes  -KH      Recorded by [KH] Emma Carvalho, PT 02/01/19 1012      Row Name 02/01/19 1004             Static Standing Balance    Level of [place] (Supported Standing, Static Balance)  moderate assist, 50  to 74% patient effort;2 person assist  -      Time Able to Maintain Position (Supported Standing, Static Balance)  2 to 3 minutes  -KH      Recorded by [KH] Emma Carvalho, PT 02/01/19 1012      Row Name 02/01/19 1004             Positioning and Restraints    Pre-Treatment Position  in bed  -KH      Post Treatment Position  bed  -KH      In Bed  sitting EOB;call light within reach;encouraged to call for assist;with family/caregiver  -KH      Recorded by [] Emma Carvalho, PT 02/01/19 1012      Row Name 02/01/19 1004             Pain Scale: Numbers Pre/Post-Treatment    Pain Scale: Numbers, Pretreatment  0/10 - no pain  -KH      Pain Scale: Numbers, Post-Treatment  0/10 - no pain  -KH      Recorded by [] Emma Carvalho, PT 02/01/19 1012        User Key  (r) = Recorded By, (t) = Taken By, (c) = Cosigned By    Initials Name Effective Dates Discipline     Emma Carvalho, PT 06/22/16 -  PT                   Physical Therapy Education     Title: PT OT SLP Therapies (Done)     Topic: Physical Therapy (Done)     Point: Mobility training (Done)     Learning Progress Summary           Patient Acceptance, E,TB, VU by  at 2/1/2019 10:06 AM    Acceptance, E,TB,D, DU,NR by  at 2/1/2019  4:54 AM    Acceptance, D, DU,NR by  at 1/31/2019 10:51 AM    Acceptance, E, VU,NR by MA at 1/30/2019  3:43 PM                   Point: Home exercise program (Done)     Learning Progress Summary           Patient Acceptance, E,TB,D, DU,NR by  at 2/1/2019  4:54 AM    Acceptance, D, DU,NR by WHITNEY at 1/31/2019 10:51 AM    Acceptance, E, VU,NR by MA at 1/30/2019  3:43 PM                   Point: Body mechanics (Done)     Learning Progress Summary           Patient Acceptance, E,TB, VU by  at 2/1/2019 10:06 AM    Acceptance, E,TB,D, DU,NR by  at 2/1/2019  4:54 AM    Acceptance, D, DU,NR by WHITNEY at 1/31/2019 10:51 AM    Acceptance, E, VU,NR by MA at 1/30/2019  3:43 PM                   Point: Precautions (Done)     Learning Progress Summary            Patient Acceptance, E,TB, VU by  at 2/1/2019 10:06 AM    Acceptance, E,TB,D, DU,NR by  at 2/1/2019  4:54 AM    Acceptance, D, DU,NR by  at 1/31/2019 10:51 AM    Acceptance, E, VU,NR by MA at 1/30/2019  3:43 PM                               User Key     Initials Effective Dates Name Provider Type Discipline     04/03/18 -  Giovana Shoemaker, PT Physical Therapist PT     04/06/17 -  Mary Sampson, RN Registered Nurse Nurse     06/22/16 -  Emma Carvalho, PT Physical Therapist PT    MA 10/19/18 -  Mohini Mcfadden, PT Physical Therapist PT                PT Recommendation and Plan  Therapy Frequency (PT Clinical Impression): daily  Plan of Care Reviewed With: patient  Progress: improving  Outcome Summary: Patient able to increase ambulation distance however continues to buckle in R LE once fatigued. Patient was motivated to walk even further however encouraged her to turn around in order to avoid a fall since she became increasingly unsteady with distance  Outcome Measures     Row Name 02/01/19 1000 01/31/19 1400 01/31/19 1000       How much help from another person do you currently need...    Turning from your back to your side while in flat bed without using bedrails?  3  -KH  --  3  -PC    Moving from lying on back to sitting on the side of a flat bed without bedrails?  3  -KH  --  3  -PC    Moving to and from a bed to a chair (including a wheelchair)?  3  -KH  --  2  -PC    Standing up from a chair using your arms (e.g., wheelchair, bedside chair)?  3  -KH  --  2  -PC    Climbing 3-5 steps with a railing?  1  -KH  --  1  -PC    To walk in hospital room?  2  -KH  --  2  -PC    AM-PAC 6 Clicks Score  15  -KH  --  13  -PC       How much help from another is currently needed...    Putting on and taking off regular lower body clothing?  --  1  -LE  --    Bathing (including washing, rinsing, and drying)  --  1  -LE  --    Toileting (which includes using toilet bed pan or urinal)  --  1  -LE  --    Putting on and  taking off regular upper body clothing  --  1  -LE  --    Taking care of personal grooming (such as brushing teeth)  --  2  -LE  --    Eating meals  --  1  -LE  --    Score  --  7  -LE  --       Modified Milana Scale    Modified Milana Scale  --  5 - Severe disability.  Bedridden, incontinent, and requiring constant nursing care and attention.  -LE  --       Functional Assessment    Outcome Measure Options  -[facility] 6 Clicks Basic Mobility (PT)  -[facility] 6 Clicks Daily Activity (OT)  -[facility] 6 Clicks Basic Mobility (PT)  -    Row Name 01/30/19 1500             How much help from another person do you currently need...    Turning from your back to your side while in flat bed without using bedrails?  3  -MA      Moving from lying on back to sitting on the side of a flat bed without bedrails?  3  -MA      Moving to and from a bed to a chair (including a wheelchair)?  2  -MA      Standing up from a chair using your arms (e.g., wheelchair, bedside chair)?  2  -MA      Climbing 3-5 steps with a railing?  1  -MA      To walk in hospital room?  2  -MA      AM-PAC 6 Clicks Score  13  -MA         Modified Milana Scale    Modified Milana Scale  3 - Moderate disability.  Requiring some help, but able to walk without assistance.  -MA         Functional Assessment    Outcome Measure Options  -[facility] 6 Clicks Basic Mobility (PT);Modified [place]  -MA        User Key  (r) = Recorded By, (t) = Taken By, (c) = Cosigned By    Initials Name Provider Type    Jasmin Romero, OTR Occupational Therapist    Giovana Dc, PT Physical Therapist    Emma Serrano, PT Physical Therapist    Mohini Bartlett, PT Physical Therapist         Time Calculation:   PT Charges     Row Name 02/01/19 1004             Time Calculation    Start Time  0940  -KRISTEL      Stop Time  0959  -KRISTEL      Time Calculation (min)  19 min  -KRISTEL      PT Received On  02/01/19  -KRISTEL      PT - Next Appointment  02/02/19  -KRISTEL        User Key  (r) = Recorded By, (t) =  Taken By, (c) = Cosigned By    Initials Name Provider Type    Emma Serrano, PT Physical Therapist        Therapy Suggested Charges     Code   Minutes Charges    None           Therapy Charges for Today     Code Description Service Date Service Provider Modifiers Qty    63195828637 HC PT THER PROC EA 15 MIN 2/1/2019 Emma Carvalho, PT GP 1    95117891697 HC PT THER SUPP EA 15 MIN 2/1/2019 Emma Carvalho, PT GP 1          PT G-Codes  Outcome Measure Options: AM-PAC 6 Clicks Basic Mobility (PT)  AM-PAC 6 Clicks Score: 15  Score: 7  Modified Milana Scale: 5 - Severe disability.  Bedridden, incontinent, and requiring constant nursing care and attention.    Emma Carvalho, LASHONDA  2/1/2019

## 2019-02-01 NOTE — THERAPY RE-EVALUATION
Acute Care - Speech Language Pathology   Swallow Re-Evaluation [facility]     Patient Name: Sommer Smith  : 1945  MRN: [id number]  Today's Date: 2019        Referring Physician: Blossom      Admit Date: 2019    Visit Dx:     ICD-10-CM ICD-9-CM   1. Acute CVA (cerebrovascular accident) (CMS/[facility]) I63.9 434.91   2. Decreased mobility R26.89 781.99     Patient Active Problem List   Diagnosis   • Acute CVA (cerebrovascular accident) (CMS/[facility])     Past Medical History:   Diagnosis Date   • Disease of thyroid gland      Past Surgical History:   Procedure Laterality Date   • BACK SURGERY     •  SECTION     • TONSILLECTOMY          SWALLOW EVALUATION (last 72 hours)      SLP Adult Swallow Evaluation     Row Name 19 1245 19 0915 19 1210             Rehab Evaluation    Document Type  re-evaluation  -SA  evaluation  -OC  evaluation  -OC      Subjective Information  no complaints  -SA  no complaints  -OC  no complaints  -OC      Patient Observations  alert;cooperative  -SA  alert;cooperative;agree to therapy  -OC  alert;cooperative;agree to therapy  -OC      Patient/Family Observations  pt sitting EOB eating noon meal w/ daughter at side  -SA  --  --      Patient Effort  good  -SA  good  -OC  good  -OC      Symptoms Noted During/After Treatment  none  -SA  none  -OC  none  -OC         General Information    Patient Profile Reviewed  --  yes  -OC  yes  -OC      Current Method of Nutrition  --  NPO  -OC  NPO  -OC      Precautions/Limitations, Vision  --  WFL;for purposes of eval  -OC  WFL;for purposes of eval  -OC      Precautions/Limitations, Hearing  --  hearing impairment, bilaterally  -OC  hearing impairment, bilaterally  -OC      Prior Level of Function-Communication  --  WFL  -OC  WFL  -OC      Prior Level of Function-Swallowing  --  no diet consistency restrictions;safe, efficient swallowing in all situations  -OC  no diet consistency restrictions;safe, efficient swallowing in  all situations  -OC      Plans/Goals Discussed with  --  patient;agreed upon  -OC  patient;agreed upon  -OC      Barriers to Rehab  --  medically complex  -OC  medically complex  -OC      Patient's Goals for Discharge  --  return to PO diet  -OC  return to PO diet  -OC         Pain Scale: Numbers Pre/Post-Treatment    Pain Scale: Numbers, Pretreatment  --  0/10 - no pain  -OC  0/10 - no pain  -OC      Pain Scale: Numbers, Post-Treatment  --  0/10 - no pain  -OC  0/10 - no pain  -OC         Oral Motor and Function    Dentition Assessment  --  --  natural, present and adequate  -OC      Secretion Management  --  --  WNL/WFL  -OC      Mucosal Quality  --  --  moist, healthy  -OC      Volitional Swallow  --  --  unable to elicit  -OC      Volitional Cough  --  --  weak  -OC         Oral Musculature and Cranial Nerve Assessment    Oral Motor General Assessment  --  --  oral labial or buccal impairment;lingual impairment;velar impairment;vocal impairment  -OC      Lingual Impairment, Detail. Cranial Nerves IX, XII (Glossopharyngeal and Hypoglossal)  --  --  reduced strength right;reduced lingual ROM  -OC      Oral Motor, Comment  --  --  moderate dysarthria, improved lingual protrustion this date  -OC         General Eating/Swallowing Observations    Consistencies Trialed  --  --  thin liquids;nectar/syrup-thick liquids;pureed;soft textures  -OC         Clinical Swallow Eval    Oral Prep Phase  --  --  impaired  -OC      Oral Transit  --  --  impaired  -OC      Oral Residue  --  --  WFL pt aware of weakness on R  -OC      Pharyngeal Phase  --  --  suspected pharyngeal impairment  -OC      Clinical Swallow Evaluation Summary  Pt demonstrated no overt s/s aspiration with thin, soft or hard solids.  Good mastication and oral clearance.  REC upgrade to soft chopped meats, no mixed consistencies.  Continue small single cup sips of thin.  -SA  --  Alertness appeared improved this date. Pt with adequate acceptance and  manipulation of ice chips this date. Pt required cues to not talk with PO in oral cavity, pt distracted. Audible swallow with thin liquids, clear vocal quality s/p swallow. No overt s/s aspiration with nectar thick, puree, and mech soft. Prolonged mastication with mech soft, pt aware of R weakness. R anterior loss X1 during eval, ? secretions versus residue.  Concern for fatigue, recommend NPO- meds crushed with puree, and VFSS next date.   -OC         MBS/VFSS Interpretation    VFSS Summary  --  Pt presents with mild-moderate oropharyngeal dysphagia characterized by prolonged mastication, mistiming, reduced velopharyngeal closure, and decreased hyolaryngeal elevation/excursion. Pt demonstrated premature spillage with honey thick and passed the vallecula with penetration during the swallow. Silent aspiration X1 with honey thick via cup large bolus requiring 2 swallows to clear from oral cavity. Cued cough unable to clear aspirated materials. Pt demonstrated mistiming with nectar thick liquids, with penetration during the swallow. Prespill of this liquids to the vallecula and pyriforms inconsistently. Pt demonstrated mild oral residue with thin liquids pooling to the vallecula, able to clear with repeat swallow. Pt demonstrated delayed swallow puree with trace penetration durin the swallow, min residue s/p the swallow. Piece meal deglutition with mech soft and prespill to the vallecula, silent aspiration on second swallow of bolus. Cued cough not effective in clearing aspirated materials. No aspiration observed with small single sips of thin, nectar thick, and honey thick, puree, mech soft no mixed.   -OC  --         SLP Communication to Radiology    Summary Statement  --  Pt presents with mild-moderate oropharyngeal dysphagia characterized by prolonged mastication, mistiming, reduced velopharyngeal closure, and decreased hyolaryngeal elevation/excursion. Pt demonstrated premature spillage with honey thick and passed  the vallecula with penetration during the swallow. Silent aspiration X1 with honey thick via cup large bolus requiring 2 swallows to clear from oral cavity. Cued cough unable to clear aspirated materials. Pt demonstrated mistiming with nectar thick liquids, with penetration during the swallow. Prespill of this liquids to the vallecula and pyriforms inconsistently. Pt demonstrated mild oral residue with thin liquids pooling to the vallecula, able to clear with repeat swallow. Pt demonstrated delayed swallow puree with trace penetration durin the swallow, min residue s/p the swallow. Piece meal deglutition with mech soft and prespill to the vallecula, silent aspiration on second swallow of bolus. Cued cough not effective in clearing aspirated materials. No aspiration observed with small single sips of thin, nectar thick, and honey thick, puree, mech soft no mixed.   -OC  --         Clinical Impression    SLP Swallowing Diagnosis  mild-moderate  -SA  mild-moderate;oral dysfunction;pharyngeal dysfunction  -OC  oral dysfunction;suspected pharyngeal dysfunction  -OC      Functional Impact  risk of aspiration/pneumonia  -SA  risk of aspiration/pneumonia  -OC  risk of aspiration/pneumonia  -OC      Rehab Potential/Prognosis, Swallowing  good, to achieve stated therapy goals  -SA  good, to achieve stated therapy goals  -OC  good, to achieve stated therapy goals  -OC      Swallow Criteria for Skilled Therapeutic Interventions Met  demonstrates skilled criteria  -SA  demonstrates skilled criteria  -OC  demonstrates skilled criteria  -OC         Recommendations    Therapy Frequency (Swallow)  PRN  -SA  PRN  -OC  PRN  -OC      Predicted Duration Therapy Intervention (Days)  until discharge  -SA  until discharge  -OC  until discharge  -OC      SLP Diet Recommendation  soft textures;chopped;thin liquids no mixed  -SA  puree with some mashed;thin liquids  -OC  ice chips between meals after oral care, with supervision;NPO  -OC       Recommended Diagnostics  VFSS (MBS);other (see comments) for upgrade to thin  -SA  reassess via VFSS (MBS);other (see comments) as warranted  -OC  [place] [place]  -OC      Recommended Precautions and Strategies  upright posture during/after eating;small bites of food and sips of liquid;no straw  -SA  upright posture during/after eating;small bites of food and sips of liquid;no straw;alternate between small bites of food and sips of liquid;other (see comments) SMALL SINGLE SIPS  -OC  upright posture during/after eating;small bites of food and sips of liquid;other (see comments) ice chips  -OC      SLP Rec. for Method of Medication Administration  meds crushed;with pudding or applesauce  -SA  meds crushed;with pudding or applesauce  -OC  meds crushed;with pudding or applesauce  -OC      Monitor for Signs of Aspiration  notify SLP if any concerns  -SA  yes;notify SLP if any concerns  -OC  yes;notify SLP if any concerns  -OC      Anticipated Dischage Disposition  anticipate therapy at next level of care  -SA  anticipate therapy at next level of care  -OC  anticipate therapy at next level of care  -OC         Swallow Goals (SLP)    Oral Nutrition/Hydration Goal Selection (SLP)  oral nutrition/hydration, SLP goal 1  -SA  oral nutrition/hydration, SLP goal 1  -OC  --         Oral Nutrition/Hydration Goal 1 (SLP)    Oral Nutrition/Hydration Goal 1, SLP  Pt will tolerate least restrictive diet with no overt s/s aspiration.   -SA  Pt will tolerate least restrictive diet with no overt s/s aspiration.   -OC  --      Time Frame (Oral Nutrition/Hydration Goal 1, SLP)  by discharge  -SA  by discharge  -OC  --      Progress/Outcomes (Oral Nutrition/Hydration Goal 1, SLP)  good progress toward goal  -SA  --  --        User Key  (r) = Recorded By, (t) = Taken By, (c) = Cosigned By    Initials Name Effective Dates    SA Tere Bautista MS [facility]-SLP 03/07/18 -     OC Samanta May MA,[facility]-SLP 06/08/18 -           EDUCATION  The  patient has been educated in the following areas:   Dysphagia (Swallowing Impairment) Modified Diet Instruction.  Re-emphasized importance of SMALL single cup sips.  Also impressed upon pt and daughter importance of sitting up as daughter gave pt bite/ice cream when lying back as pt's back was hurting.  Repositioned in bed and HOB elevated with no difficulty or c/o pain.    SLP Recommendation and Plan  SLP Swallowing Diagnosis: mild-moderate  SLP Diet Recommendation: soft textures, chopped, thin liquids(no mixed)  Recommended Precautions and Strategies: upright posture during/after eating, small bites of food and sips of liquid, no straw     Monitor for Signs of Aspiration: notify SLP if any concerns  Recommended Diagnostics: VFSS (MBS), other (see comments)(for upgrade to thin)  Swallow Criteria for Skilled Therapeutic Interventions Met: demonstrates skilled criteria  Anticipated Dischage Disposition: anticipate therapy at next level of care  Rehab Potential/Prognosis, Swallowing: good, to achieve stated therapy goals  Therapy Frequency (Swallow): PRN  Predicted Duration Therapy Intervention (Days): until discharge       Plan of Care Reviewed With: patient, family  Plan of Care Review  Plan of Care Reviewed With: patient, family  Outcome Summary: Pt demonstrated no overt s/s aspiration with thin, soft or hard solids.  Good mastication and oral clearance.  REC upgrade to soft chopped meats, no mixed consistencies.  Continue small single cup sips of thin.    SLP GOALS     Row Name 02/01/19 1245 01/31/19 0915          Oral Nutrition/Hydration Goal 1 (SLP)    Oral Nutrition/Hydration Goal 1, SLP  Pt will tolerate least restrictive diet with no overt s/s aspiration.   -SA  Pt will tolerate least restrictive diet with no overt s/s aspiration.   -OC     Time Frame (Oral Nutrition/Hydration Goal 1, SLP)  by discharge  -SA  by discharge  -OC     Progress/Outcomes (Oral Nutrition/Hydration Goal 1, SLP)  good progress toward  goal  -  --       User Key  (r) = Recorded By, (t) = Taken By, (c) = Cosigned By    Initials Name Provider Type    Tere Jay MS CCC-SLP Speech and Language Pathologist    OC Samanta May MA,CCC-SLP Speech and Language Pathologist           SLP Outcome Measures (last 72 hours)      SLP Outcome Measures     Row Name 02/01/19 1400 01/31/19 0800 01/30/19 1223       SLP Outcome Measures    Outcome Measure Used?  Adult NOMS  -SA  Adult NOMS  -OC  Adult NOMS  -OC       Adult FCM Scores    FCM Chosen  --  Swallowing  -OC  Swallowing  -OC    Swallowing FCM Score  4  -SA  4  -OC  1  -OC      User Key  (r) = Recorded By, (t) = Taken By, (c) = Cosigned By    Initials Name Effective Dates    Tere Jay MS CCC-SLP 03/07/18 -     Samanta Gannon MA, CCC-SLP 06/08/18 -            Time Calculation:   Time Calculation- SLP     Row Name 02/01/19 1427             Time Calculation- [facility]    SLP Start Time  1200  -      SLP Stop Time  1300  -      SLP Time Calculation (min)  60 min  -      SLP Received On  02/01/19  -        User Key  (r) = Recorded By, (t) = Taken By, (c) = Cosigned By    Initials Name Provider Type    Tere Jay MS CCC-SLP Speech and Language Pathologist          Therapy Charges for Today     Code Description Service Date Service Provider Modifiers Qty    [id number] HC ST TREATMENT SWALLOW 4 2/1/2019 Tere Bautista MS CCC-SLP GN 1               Tere Bautista MS CCC-JADA  2/1/2019

## 2019-02-01 NOTE — PLAN OF CARE
Problem: Patient Care Overview  Goal: Plan of Care Review  Outcome: Ongoing (interventions implemented as appropriate)   02/01/19 8360   Plan of Care Review   Progress improving   OTHER   Outcome Summary Pt with improved R UE AROM, reaching, balance and standing. Assist of 1 to stand at EOB. Dep for toileting   Coping/Psychosocial   Plan of Care Reviewed With patient;daughter

## 2019-02-01 NOTE — THERAPY TREATMENT NOTE
Acute Care - Occupational Therapy Progress Note  [facility]     Patient Name: Sommer Smith  : 1945  MRN: [id number]  Today's Date: 2019        Referring Physician: Blossom    Admit Date: 2019       ICD-10-CM ICD-9-CM   1. Acute CVA (cerebrovascular accident) (CMS/HCC) I63.9 434.91   2. Decreased mobility R26.89 781.99     Patient Active Problem List   Diagnosis   • Acute CVA (cerebrovascular accident) (CMS/HCC)     Past Medical History:   Diagnosis Date   • Disease of thyroid gland      Past Surgical History:   Procedure Laterality Date   • BACK SURGERY     •  SECTION     • TONSILLECTOMY         Therapy Treatment    Rehabilitation Treatment Summary     Row Name 19 1542 19 1004          Treatment Time/Intention    Discipline  occupational therapist  -LE  physical therapist  -     Document Type  therapy note (daily note)  -LE  therapy note (daily note)  -KRISTEL     Subjective Information  complains of;weakness  -LE  no complaints  -     Mode of Treatment  --  physical therapy;individual therapy  -     Patient/Family Observations  on bedpan in bed, dght present.  pt scooting in bed  -LE  patient laying in bed, daughter present  -KRISTEL     Therapy Frequency (PT Clinical Impression)  --  daily  -KH     Patient Effort  good  -LE  good  -KH     Comment  RN present, aware needing bed alarm  -LE  --     Existing Precautions/Restrictions  fall  -LE  fall  (Significant)  [facility]  -     Recorded by [LE] Jasmin Sol, OTR 19 1547 [KH] Emma Carvalho, PT 19 1012     Row Name 19 1542 19 1004          Vital Signs    O2 Delivery Pre Treatment  room air  -LE  room air  -KH     O2 Delivery Intra Treatment  room air  -LE  --     O2 Delivery Post Treatment  room air  -LE  --     Pre Patient Position  Supine  -LE  --     Intra Patient Position  Standing  -LE  --     Post Patient Position  Supine  -LE  --     Recorded by [ADRIANNE] Jasmin Sol,  [KH] Emma Carvalho, PT  02/01/19 1012     Row Name 02/01/19 1542 02/01/19 1004          Cognitive Assessment/Intervention- PT/OT    Orientation Status (Cognition)  --  oriented x 4  -KH     Follows Commands (Cognition)  --  follows one step commands;75-90% accuracy  -KH     Safety Deficit (Cognitive)  --  mild deficit  -KH     Personal Safety Interventions  --  fall prevention program maintained;gait belt;nonskid shoes/slippers when out of bed;supervised activity  -KH     Cognitive Assessment/Intervention Comment  dght getting new batteries for hearing aids  -LE  --     Recorded by [LE] Jasmin Sol, OTR 02/01/19 1548 [KH] Emma Carvalho, PT 02/01/19 1012     Row Name 02/01/19 1542 02/01/19 1004          Bed Mobility Assessment/Treatment    Bed Mobility Assessment/Treatment  --  supine-sit;sit-supine  -KH     Supine-Sit [place] (Bed Mobility)  moderate assist (50% patient effort)  -LE  minimum assist (75% patient effort)  -KH     Sit-Supine [place] (Bed Mobility)  minimum assist (75% patient effort)  -LE  --     Bed Mobility, Safety Issues  decreased use of arms for pushing/pulling;decreased use of legs for bridging/pushing;impaired trunk control for bed mobility  -LE  decreased use of arms for pushing/pulling;decreased use of legs for bridging/pushing  -     Assistive Device (Bed Mobility)  --  bed rails;head of bed elevated  -KH     Recorded by [LE] Jasmin Sol, OTR 02/01/19 1548 [KH] Emma Carvalho, PT 02/01/19 1012     Row Name 02/01/19 1542             Functional Mobility    Functional Mobility- Ind. Level  not tested  -LE      Recorded by [LE] Jasmin Sol, OTR 02/01/19 1548      Row Name 02/01/19 1004             Transfer Assessment/Treatment    Transfer Assessment/Treatment  sit-stand transfer;stand-sit transfer  -KH      Recorded by [KH] Emma Carvalho, PT 02/01/19 1012      Row Name 02/01/19 1542 02/01/19 1004          Sit-Stand Transfer    Sit-Stand [place] (Transfers)  moderate assist (50% patient effort)  -LE   moderate assist (50% patient effort);2 person assist  -KH     Assistive Device (Sit-Stand Transfers)  -- block R knee less today.   -LE  walker, front-wheeled  -KH     Recorded by [LE] Jasmin Sol, OTR 02/01/19 1548 [KH] Emma Carvalho, PT 02/01/19 1012     Row Name 02/01/19 1542 02/01/19 1004          Stand-Sit Transfer    Stand-Sit [place] (Transfers)  moderate assist (50% patient effort)  -LE  minimum assist (75% patient effort)  -KH     Assistive Device (Stand-Sit Transfers)  --  walker, front-wheeled  -KH     Recorded by [LE] Jasmin Sol, OTR 02/01/19 1548 [KH] Emma Carvalho, PT 02/01/19 1012     Row Name 02/01/19 1004             Gait/Stairs Assessment/Training    [place] Level (Gait)  moderate assist (50% patient effort);2 person assist  -KH      Distance in Feet (Gait)  30  -KH      Pattern (Gait)  step-to  -KH      Deviations/Abnormal Patterns (Gait)  right sided deviations  -KH      Right Sided Gait Deviations  heel strike decreased;knee buckling, right side;weight shift ability decreased  -KH      Recorded by [KH] Emma Carvalho, PT 02/01/19 1012      Row Name 02/01/19 1542             Toileting Assessment/Training    [place] Level (Toileting)  -- dep using bedpan  -LE      Recorded by [LE] Jasmin Sol, OTR 02/01/19 1548      Row Name 02/01/19 1542             Right Upper Ext    Rt Upper Extremity Comments   -- 1/4 shld AROM, 1/2 elbow flex, 2/3 open/close fingers  -LE      Recorded by [LE] Jasmin Sol, OTR 02/01/19 1548      Row Name 02/01/19 1542             MMT (Manual Muscle Testing)    General MMT Comments  fair  R hand  -LE      Recorded by [LE] Jasmin Sol, OTR 02/01/19 1548      Row Name 02/01/19 1542             Motor Skills Assessment/Interventions    Additional Documentation  Therapeutic Exercise (Group)  -LE      Recorded by [LE] Jasmin Sol, OTR 02/01/19 1548      Row Name 02/01/19 1542             Therapeutic Exercise    Upper Extremity (Therapeutic Exercise)  bicep curl,  right  -LE      Upper Extremity Range of Motion (Therapeutic Exercise)  shoulder flexion/extension, bilateral  -LE      Hand (Therapeutic Exercise)  finger flexion/extension, right  -LE      Exercise Type (Therapeutic Exercise)  AROM (active range of motion);AAROM (active assistive range of motion) AAROM at shld, AROM elbow and fingers  -LE      Position (Therapeutic Exercise)  supine;seated  -LE      Sets/Reps (Therapeutic Exercise)  1 set of 3 to 5  -LE      Expected Outcome (Therapeutic Exercise)  facilitate normal movement patterns  -LE      Recorded by [LE] Jasmin Sol, OTR 02/01/19 1548      Row Name 02/01/19 1542 02/01/19 1004          Static Sitting Balance    Level of [place] (Unsupported Sitting, Static Balance)  contact guard assist;supervision  -LE  supervision  -KH     Sitting Position (Unsupported Sitting, Static Balance)  sitting on edge of bed  -LE  sitting on edge of bed  -KH     Time Able to Maintain Position (Unsupported Sitting, Static Balance)  4 to 5 minutes  -LE  2 to 3 minutes  -KH     Recorded by [LE] Jasmin Sol, OTBRENT 02/01/19 1548 [KH] Emma Carvalho, PT 02/01/19 1012     Row Name 02/01/19 1542 02/01/19 1004          Static Standing Balance    Level of [place] (Supported Standing, Static Balance)  moderate assist, 50 to 74% patient effort  -LE  moderate assist, 50 to 74% patient effort;2 person assist  -KH     Time Able to Maintain Position (Supported Standing, Static Balance)  30 to 45 seconds  -LE  2 to 3 minutes  -KH     Recorded by [LE] Jasmin Sol, OTBRENT 02/01/19 1548 [KH] Emma Carvalho, PT 02/01/19 1012     Row Name 02/01/19 1542 02/01/19 1004          Positioning and Restraints    Pre-Treatment Position  in bed  -LE  in bed  -KH     Post Treatment Position  bed  -LE  bed  -KH     In Bed  notified nsg;side lying left;call light within reach;encouraged to call for assist;with family/caregiver;with nsg;pillow between legs  -LE  sitting EOB;call light within reach;encouraged to  call for assist;with family/caregiver  -KH     Recorded by [LE] Ebenezer Jasmin, OTR 02/01/19 1548 [KH] Emma Carvalho, PT 02/01/19 1012     Row Name 02/01/19 1542 02/01/19 1004          Pain Scale: Numbers Pre/Post-Treatment    Pain Scale: Numbers, Pretreatment  0/10 - no pain  -LE  0/10 - no pain  -KH     Pain Scale: Numbers, Post-Treatment  --  0/10 - no pain  -KH     Recorded by [LE] Jasmin Sol, OTR 02/01/19 1548 [KH] Emma Carvalho, PT 02/01/19 1012     Row Name 02/01/19 1542             Plan of Care Review    Plan of Care Reviewed With  patient;daughter  -LE      Recorded by [LE] Jasmin Sol, OTR 02/01/19 1548        User Key  (r) = Recorded By, (t) = Taken By, (c) = Cosigned By    Initials Name Effective Dates Discipline    LE Jasmin Sol, OTR 06/08/18 -  OT     Emma Carvalho, PT 06/22/16 -  PT           Rehab Goal Summary     Row Name 02/01/19 1245             Swallow Goals (SLP)    Oral Nutrition/Hydration Goal Selection (SLP)  oral nutrition/hydration, SLP goal 1  -SA         Oral Nutrition/Hydration Goal 1 (SLP)    Oral Nutrition/Hydration Goal 1, SLP  Pt will tolerate least restrictive diet with no overt s/s aspiration.   -SA      Time Frame (Oral Nutrition/Hydration Goal 1, SLP)  by discharge  -SA      Progress/Outcomes (Oral Nutrition/Hydration Goal 1, SLP)  good progress toward goal  -SA        User Key  (r) = Recorded By, (t) = Taken By, (c) = Cosigned By    Initials Name Provider Type Discipline    SA Tere Bautista MS CCC-SLP Speech and Language Pathologist SLP        Occupational Therapy Education     Title: PT OT SLP Therapies (Done)     Topic: Occupational Therapy (Done)     Point: ADL training (Done)     Description: Instruct learner(s) on proper safety adaptation and remediation techniques during self care or transfers.   Instruct in proper use of assistive devices.    Learning Progress Summary           Patient Acceptance, E,TB,D, DU,NR by  at 2/1/2019  4:54 AM                   Point: Home  exercise program (Done)     Description: Instruct learner(s) on appropriate technique for monitoring, assisting and/or progressing therapeutic exercises/activities.    Learning Progress Summary           Patient Acceptance, E,TB,D, DU,NR by  at 2/1/2019  4:54 AM                   Point: Precautions (Done)     Description: Instruct learner(s) on prescribed precautions during self-care and functional transfers.    Learning Progress Summary           Patient Acceptance, E,TB,D, DU,NR by  at 2/1/2019  4:54 AM    REG Gupta VU by ADRIANNE at 1/31/2019  2:56 PM    Comment:  Ed role of OT in acute care. Ed body mechanics and hand placement during xfers. DIscuss goals and plan of care.                   Point: Body mechanics (Done)     Description: Instruct learner(s) on proper positioning and spine alignment during self-care, functional mobility activities and/or exercises.    Learning Progress Summary           Patient Acceptance, E,TB,D, DU,NR by  at 2/1/2019  4:54 AM    REG Gupta VU by ADRIANNE at 1/31/2019  2:56 PM    Comment:  Ed role of OT in acute care. Ed body mechanics and hand placement during xfers. DIscuss goals and plan of care.                               User Key     Initials Effective Dates Name Provider Type Discipline     06/08/18 -  Jasmin Sol OTR Occupational Therapist OT     04/06/17 -  Mary Sampson RN Registered Nurse Nurse                OT Recommendation and Plan  Outcome Summary/Treatment Plan (OT)  Anticipated Equipment Needs at Discharge (OT): (pending progress)  Anticipated Discharge Disposition (OT): inpatient rehabilitation facility  Planned Therapy Interventions (OT Eval): activity tolerance training, adaptive equipment training, BADL retraining, functional balance retraining, ROM/therapeutic exercise, strengthening exercise, transfer/mobility retraining, patient/caregiver education/training, neuromuscular control/coordination retraining  Therapy Frequency (OT Eval): 5 times/wk  Plan of  Care Review  Plan of Care Reviewed With: patient, daughter  Plan of Care Reviewed With: patient, daughter  Outcome Summary: Pt with improved R UE AROM, reaching, balance and standing. Assist of 1 to stand at EOB. Dep for toileting  Outcome Measures     Row Name 02/01/19 1500 02/01/19 1000 01/31/19 1400       How much help from another person do you currently need...    Turning from your back to your side while in flat bed without using bedrails?  --  3  -KH  --    Moving from lying on back to sitting on the side of a flat bed without bedrails?  --  3  -KH  --    Moving to and from a bed to a chair (including a wheelchair)?  --  3  -KH  --    Standing up from a chair using your arms (e.g., wheelchair, bedside chair)?  --  3  -KH  --    Climbing 3-5 steps with a railing?  --  1  -KH  --    To walk in hospital room?  --  2  -KH  --    AM-PAC 6 Clicks Score  --  15  -KH  --       How much help from another is currently needed...    Putting on and taking off regular lower body clothing?  1  -LE  --  1  -LE    Bathing (including washing, rinsing, and drying)  2  -LE  --  1  -LE    Toileting (which includes using toilet bed pan or urinal)  1  -LE  --  1  -LE    Putting on and taking off regular upper body clothing  2  -LE  --  1  -LE    Taking care of personal grooming (such as brushing teeth)  2  -LE  --  2  -LE    Eating meals  2  -LE  --  1  -LE    Score  10  -LE  --  7  -LE       Modified [place] Scale    Modified [place] Scale  --  --  5 - Severe disability.  Bedridden, incontinent, and requiring constant nursing care and attention.  -LE       Functional Assessment    Outcome Measure Options  AM-PAC 6 Clicks Daily Activity (OT)  -LE  AM-PAC 6 Clicks Basic Mobility (PT)  -KH  AM-PAC 6 Clicks Daily Activity (OT)  -LE    Row Name 01/31/19 1000 01/30/19 1500          How much help from another person do you currently need...    Turning from your back to your side while in flat bed without using bedrails?  3  -PC  3  -MA      Moving from lying on back to sitting on the side of a flat bed without bedrails?  3  -PC  3  -MA     Moving to and from a bed to a chair (including a wheelchair)?  2  -PC  2  -MA     Standing up from a chair using your arms (e.g., wheelchair, bedside chair)?  2  -PC  2  -MA     Climbing 3-5 steps with a railing?  1  -PC  1  -MA     To walk in hospital room?  2  -PC  2  -MA     AM-PAC 6 Clicks Score  13  -PC  13  -MA        Modified [place] Scale    Modified [place] Scale  --  3 - Moderate disability.  Requiring some help, but able to walk without assistance.  -MA        Functional Assessment    Outcome Measure Options  AM-PAC 6 Clicks Basic Mobility (PT)  -PC  AM-PAC 6 Clicks Basic Mobility (PT);Modified [place]  -MA       User Key  (r) = Recorded By, (t) = Taken By, (c) = Cosigned By    Initials Name Provider Type    Jasmin Romero OTBRENT Occupational Therapist    Giovana Dc, PT Physical Therapist    Emma Serrano, PT Physical Therapist    Mohini Bartlett, PT Physical Therapist           Time Calculation:   Time Calculation- OT     Row Name 02/01/19 1548             Time Calculation- OT    OT Start Time  1515  -LE      OT Stop Time  1535  -LE      OT Time Calculation (min)  20 min  -LE      Total Timed Code Minutes- OT  20 minute(s)  -LE      OT Received On  02/01/19  -        User Key  (r) = Recorded By, (t) = Taken By, (c) = Cosigned By    Initials Name Provider Type    Jasmin Romero, OTR Occupational Therapist           Therapy Suggested Charges     Code   Minutes Charges    None           Therapy Charges for Today     Code Description Service Date Service Provider Modifiers Qty    34741836328 HC OT EVAL MOD COMPLEXITY 2 1/31/2019 Jasmin Sol OTR GO 1    94577487052 HC OT THERAPEUTIC ACT EA 15 MIN 1/31/2019 Jasmin Sol OTR GO 1    34469857068 HC OT THERAPEUTIC ACT EA 15 MIN 2/1/2019 Jasmin Sol OTR GO 1               MONE Florian  2/1/2019

## 2019-02-01 NOTE — DISCHARGE PLACEMENT REQUEST
"Magali Boyce (74 y.o. Female)     Date of Birth Social Security Number Address Home Phone MRN    1945  [id number] [id number] [phone] [id number]    Denominational Marital Status          None Single       Admission Date Admission Type Admitting Provider Attending Provider Department, Room/Bed    1/28/19 Emergency Kang Mccarty MD Kellie, Scott P, MD [address], [address]    Discharge Date Discharge Disposition Discharge Destination                       Attending Provider:  Maxwell Cerrato MD    Allergies:  Penicillins, Sulfate    Isolation:  None   Infection:  None   Code Status:  CPR    Ht:  177.8 cm (70\")   Wt:  64 kg (141 lb)    Admission Cmt:  None   Principal Problem:  None                Active Insurance as of 1/28/2019     Primary Coverage     Payor Plan Insurance Group Employer/Plan Group    ANTHEM MEDICARE REPLACEMENT ANTHEM MEDICARE ADVANTAGE [id number]     Payor Plan Address Payor Plan Phone Number Payor Plan Fax Number Effective Dates    [address] [phone]  1/1/2018 - None Entered    [facility] [address]       Subscriber Name Subscriber Birth Date Member ID       MAGALI BOYCE 1945 [id number]                 Emergency Contacts      (Rel.) Home Phone Work Phone Mobile Phone    ASHOK REYES (Son) [phone] -- --    Gunjan Reyes (Daughter) -- -- [phone]    Barbara Schafer (Daughter) -- -- [phone]              "

## 2019-02-01 NOTE — NURSING NOTE
Referral per stroke order set. Pt ambulated 1/31 10ft x2 with a seated rest break and had difficulty with knee buckling.   Spoke with pt, dgt Sintia, and son Phillip about acute vs subcute rehab. Confirmed with [employer] insurance verification pt has a Medicare replacement and would need a precert. (pt was unaware she had a replacement plan).   Discussed dc plan after rehab and family is unsure if 24/7 asst will be available. Anticipate pt needing 24/7 asst for safety per therapy notes.   Family would like to discuss rehab options (acute vs subacute) along with who would be available to stay with pt 24/7 after dc. Road to recovery given to family per CCP Melissa.   Will continue to follow progress and also their determination of rehab facility.     Liz Hammond RN  Acute Rehab Admission Nurse

## 2019-02-01 NOTE — PROGRESS NOTES
[facility]      Mar/chart reviewed  F/u acute CVA s/p TPA  No new complaints, still with right face/arm weakness and dysarthria    Vital Sign Min/Max for last 24 hours  Temp  Min: 98 °F (36.7 °C)  Max: 98.4 °F (36.9 °C)   BP  Min: 149/92  Max: 156/89   Pulse  Min: 85  Max: 85   Resp  Min: 16  Max: 16   SpO2  Min: 95 %  Max: 95 %   Flow (L/min)  Min: 2  Max: 2   No Data Recorded     Nad, axox3,   perrl, eomi, no icterus,  mmm, no jvd, trachea midline, neck supple,  chest cta bilaterally, no crackles, no wheezes,   rrr,   soft, nt, nd +bs,  no c/c/e     Echo: severe MAC, otherwise unremarkable  MRI: acute infarct within posterior limb of the internal capsule and left corona radiata  Repeat ct head at 1/29 ok        ASSESSMENT:  Acute right-sided facial droop/weakness  Suspected CVA  Status post TPA   Hypokalemia  Active tobacco abuse  Hypothyroidism  Incidental meningioma  Incidental Old left [place] lacunar stroke  Dysphagia      PLAN:  Asa/plavix for three months  lipitor  thiamine  Placement  Modified diet  Pt/ot  D/c when placement arranged    Will keep on our service as she will likely go to rehab soon.   D/w daughter and speech at bedside

## 2019-02-01 NOTE — PLAN OF CARE
Problem: Patient Care Overview  Goal: Plan of Care Review  Outcome: Ongoing (interventions implemented as appropriate)   02/01/19 6427   OTHER   Outcome Summary Pt demonstrated no overt s/s aspiration with thin, soft or hard solids. Good mastication and oral clearance. REC upgrade to soft chopped meats, no mixed consistencies. Continue small single cup sips of thin.   Coping/Psychosocial   Plan of Care Reviewed With patient;family

## 2019-02-02 LAB — CREAT BLDA-MCNC: 0.9 MG/DL (ref 0.6–1.3)

## 2019-02-02 PROCEDURE — 25010000002 CYANOCOBALAMIN PER 1000 MCG: Performed by: NURSE PRACTITIONER

## 2019-02-02 PROCEDURE — 97110 THERAPEUTIC EXERCISES: CPT

## 2019-02-02 RX ORDER — AMLODIPINE BESYLATE 5 MG/1
5 TABLET ORAL
Status: DISCONTINUED | OUTPATIENT
Start: 2019-02-02 | End: 2019-02-05

## 2019-02-02 RX ADMIN — SODIUM CHLORIDE, PRESERVATIVE FREE 10 ML: 5 INJECTION INTRAVENOUS at 20:39

## 2019-02-02 RX ADMIN — CYANOCOBALAMIN 1000 MCG: 1000 INJECTION, SOLUTION INTRAMUSCULAR; SUBCUTANEOUS at 09:27

## 2019-02-02 RX ADMIN — AMLODIPINE BESYLATE 5 MG: 5 TABLET ORAL at 20:38

## 2019-02-02 RX ADMIN — ASPIRIN 81 MG: 81 TABLET, CHEWABLE ORAL at 09:27

## 2019-02-02 RX ADMIN — Medication 5 MG: at 20:38

## 2019-02-02 RX ADMIN — ATORVASTATIN CALCIUM 40 MG: 20 TABLET, FILM COATED ORAL at 20:38

## 2019-02-02 RX ADMIN — Medication 1 TABLET: at 09:27

## 2019-02-02 RX ADMIN — LEVOTHYROXINE SODIUM 125 MCG: 125 TABLET ORAL at 09:27

## 2019-02-02 RX ADMIN — NICOTINE 1 PATCH: 21 PATCH, EXTENDED RELEASE TRANSDERMAL at 09:27

## 2019-02-02 RX ADMIN — CLOPIDOGREL 75 MG: 75 TABLET, FILM COATED ORAL at 09:27

## 2019-02-02 NOTE — PLAN OF CARE
Problem: Patient Care Overview  Goal: Plan of Care Review  Outcome: Ongoing (interventions implemented as appropriate)   02/02/19 0334   Plan of Care Review   Progress improving   OTHER   Outcome Summary VSS. Pt tolerating diet and very motivated to work with PT. CIWA: 0. NIH:4, but it is improving quickly. Plan is to D/C to rehab. Will CTM   Coping/Psychosocial   Plan of Care Reviewed With patient;daughter

## 2019-02-02 NOTE — SIGNIFICANT NOTE
02/02/19 1532   Rehab Treatment   Discipline physical therapy assistant   Reason Treatment Not Performed unavailable for treatment  (pt having EMG done in room; PT to follow up tomorrow)   Recommendation   PT - Next Appointment 02/03/19

## 2019-02-02 NOTE — THERAPY TREATMENT NOTE
Acute Care - Physical Therapy Treatment Note  [facility]     Patient Name: Sommer Smith  : 1945  MRN: [id number]  Today's Date: 2019        Referring Physician: Blososm    Admit Date: 2019    Visit Dx:    ICD-10-CM ICD-9-CM   1. Acute CVA (cerebrovascular accident) (CMS/Formerly [facility]) I63.9 434.91   2. Decreased mobility R26.89 781.99     Patient Active Problem List   Diagnosis   • Acute CVA (cerebrovascular accident) (CMS/Formerly [facility])       Therapy Treatment    Rehabilitation Treatment Summary     Row Name 19 1532 19 1505          Treatment Time/Intention    Discipline  --  -  physical therapy assistant  -     Document Type  --  therapy note (daily note)  -     Subjective Information  --  no complaints  -     Mode of Treatment  --  physical therapy  -     Patient Effort  --  good  -SM2     Reason Treatment Not Performed  --  -SM  --     Existing Precautions/Restrictions  --  fall  -SM2     Recorded by [SM] Sylvie Harp, [facility] 19 1551 [SM] Sylvie Harp, [facility] 19 1532  [SM2] Sylvie Harp, [facility] 19 1548     Row Name 19 1505             Cognitive Assessment/Intervention- PT/OT    Orientation Status (Cognition)  oriented x 4  -SM      Follows Commands (Cognition)  follows two step commands  -SM      Safety Deficit (Cognitive)  mild deficit  -SM      Personal Safety Interventions  fall prevention program maintained;gait belt;nonskid shoes/slippers when out of bed  -SM      Recorded by [SM] Sylvie Harp, [facility] 19 1548      Row Name 19 1505             Bed Mobility Assessment/Treatment    Comment (Bed Mobility)  sitting on EOB  -SM      Recorded by [SM] Sylvie Harp, [facility] 19 1548      Row Name 19 1505             Transfer Assessment/Treatment    Transfer Assessment/Treatment  sit-stand transfer;stand-sit transfer  -SM      Recorded by [SM] Sylvie Harp, [facility] 19 1548      Row Name 19 1505             Sit-Stand  Transfer    Sit-Stand [place] (Transfers)  minimum assist (75% patient effort)  -      Assistive Device (Sit-Stand Transfers)  walker, front-wheeled  -      Recorded by [] Sylvie Harp [facility] 02/02/19 1548      Row Name 02/02/19 1505             Stand-Sit Transfer    Stand-Sit [place] (Transfers)  contact guard  -      Assistive Device (Stand-Sit Transfers)  walker, front-wheeled  -SM      Recorded by [] Sylvie Harp [facility] 02/02/19 1548      Row Name 02/02/19 1505             Gait/Stairs Assessment/Training    [place] Level (Gait)  minimum assist (75% patient effort);2 person assist  -      Assistive Device (Gait)  walker, front-wheeled  -      Distance in Feet (Gait)  100  -SM      Pattern (Gait)  step-through  -      Deviations/Abnormal Patterns (Gait)  right sided deviations;antalgic;shruthi decreased;stride length decreased  -      Right Sided Gait Deviations  heel strike decreased  -      Comment (Gait/Stairs)  1 LOB requiring mod A to recover  -SM      Recorded by [] Sylvie Harp PTA 02/02/19 1548      Row Name 02/02/19 1505             Motor Skills Assessment/Interventions    Additional Documentation  Therapeutic Exercise (Group)  -      Recorded by [] Sylvie Harp PTA 02/02/19 1548      Row Name 02/02/19 1505             Therapeutic Exercise    Lower Extremity (Therapeutic Exercise)  LAQ (long arc quad), bilateral;marching while seated  -      Lower Extremity Range of Motion (Therapeutic Exercise)  ankle dorsiflexion/plantar flexion, bilateral  -      Exercise Type (Therapeutic Exercise)  AROM (active range of motion)  -      Position (Therapeutic Exercise)  seated  -      Sets/Reps (Therapeutic Exercise)  10 reps  -SM      Recorded by [] Sylvie Harp PTA 02/02/19 1548      Row Name 02/02/19 1505             Positioning and Restraints    Pre-Treatment Position  in bed  -      Post Treatment Position  bed  -SM      In Bed   sitting EOB;call light within reach;encouraged to call for assist;with family/caregiver  -      Recorded by [] Sylvie Harp, PTA 02/02/19 1548      Row Name 02/02/19 1505             Pain Scale: Numbers Pre/Post-Treatment    Pain Scale: Numbers, Pretreatment  0/10 - no pain  -SM      Pain Scale: Numbers, Post-Treatment  0/10 - no pain  -SM      Recorded by [] Sylvie Harp, PTA 02/02/19 1548        User Key  (r) = Recorded By, (t) = Taken By, (c) = Cosigned By    Initials Name Effective Dates Discipline     Sylvie Harp, PTA 03/07/18 -  PT                   Physical Therapy Education     Title: PT OT SLP Therapies (Done)     Topic: Physical Therapy (Done)     Point: Mobility training (Done)     Learning Progress Summary           Patient Acceptance, E,D, VU,NR by  at 2/2/2019  3:48 PM    Acceptance, E,TB, VU by  at 2/1/2019 10:06 AM    Acceptance, E,TB,D, DU,NR by  at 2/1/2019  4:54 AM    Acceptance, D, DU,NR by WHITNEY at 1/31/2019 10:51 AM    Acceptance, E, VU,NR by MA at 1/30/2019  3:43 PM                   Point: Home exercise program (Done)     Learning Progress Summary           Patient Acceptance, E,D, VU,NR by  at 2/2/2019  3:48 PM    Acceptance, E,TB,D, DU,NR by  at 2/1/2019  4:54 AM    Acceptance, D, DU,NR by WHITNEY at 1/31/2019 10:51 AM    Acceptance, E, VU,NR by MA at 1/30/2019  3:43 PM                   Point: Body mechanics (Done)     Learning Progress Summary           Patient Acceptance, E,D, VU,NR by  at 2/2/2019  3:48 PM    Acceptance, E,TB, VU by  at 2/1/2019 10:06 AM    Acceptance, E,TB,D, DU,NR by  at 2/1/2019  4:54 AM    Acceptance, D, DU,NR by WHITNEY at 1/31/2019 10:51 AM    Acceptance, E, VU,NR by MA at 1/30/2019  3:43 PM                   Point: Precautions (Done)     Learning Progress Summary           Patient Acceptance, E,D, VU,NR by GENOVEVA at 2/2/2019  3:48 PM    Acceptance, ELIEZER FINNEY VU by KRISTEL at 2/1/2019 10:06 AM    Acceptance, ELIEZER FINNEY D, KODY,NR by  at 2/1/2019   4:54 AM    Acceptance, KODY BOND,NR by  at 1/31/2019 10:51 AM    Acceptance, ROYCE FINNEY,NR by MA at 1/30/2019  3:43 PM                               User Key     Initials Effective Dates Name Provider Type Discipline    PC 04/03/18 -  Giovana Shoemaker, PT Physical Therapist PT     04/06/17 -  Mary Sampson, RN Registered Nurse Nurse     03/07/18 -  Sylvie Harp, PTA Physical Therapy Assistant PT     06/22/16 -  Emma Carvalho, PT Physical Therapist PT    MA 10/19/18 -  Mohini Mcfadden, PT Physical Therapist PT                PT Recommendation and Plan     Plan of Care Reviewed With: patient  Progress: improving  Outcome Summary: Pt tolerated treatment well this date. Increased gait distance to 100ft w/ Rw and min A x2. Demostrated 1 LOB requiring mod A to recover. Pt continues to demonstrate decreased heelstrike on R LE while ambulating. Discussed several exercises to perform throughout the day. PT will continue to address functional mobility deficits as tolerated.  Outcome Measures     Row Name 02/02/19 1500 02/01/19 1500 02/01/19 1000       How much help from another person do you currently need...    Turning from your back to your side while in flat bed without using bedrails?  3  -SM  --  3  -KH    Moving from lying on back to sitting on the side of a flat bed without bedrails?  3  -SM  --  3  -KH    Moving to and from a bed to a chair (including a wheelchair)?  3  -SM  --  3  -KH    Standing up from a chair using your arms (e.g., wheelchair, bedside chair)?  3  -SM  --  3  -KH    Climbing 3-5 steps with a railing?  2  -SM  --  1  -KH    To walk in hospital room?  2  -SM  --  2  -KH    AM-PAC 6 Clicks Score  16  -SM  --  15  -KH       How much help from another is currently needed...    Putting on and taking off regular lower body clothing?  --  1  -LE  --    Bathing (including washing, rinsing, and drying)  --  2  -LE  --    Toileting (which includes using toilet bed pan or urinal)  --  1  -LE  --    Putting on  and taking off regular upper body clothing  --  2  -LE  --    Taking care of personal grooming (such as brushing teeth)  --  2  -LE  --    Eating meals  --  2  -LE  --    Score  --  10  -LE  --       Functional Assessment    Outcome Measure Options  -[facility] 6 Clicks Basic Mobility (PT)  -[facility] 6 Clicks Daily Activity (OT)  -LE  -[facility] 6 Clicks Basic Mobility (PT)  -    Row Name 01/31/19 1400 01/31/19 1000          How much help from another person do you currently need...    Turning from your back to your side while in flat bed without using bedrails?  --  3  -PC     Moving from lying on back to sitting on the side of a flat bed without bedrails?  --  3  -PC     Moving to and from a bed to a chair (including a wheelchair)?  --  2  -PC     Standing up from a chair using your arms (e.g., wheelchair, bedside chair)?  --  2  -PC     Climbing 3-5 steps with a railing?  --  1  -PC     To walk in hospital room?  --  2  -PC     AM-[facility] 6 Clicks Score  --  13  -PC        How much help from another is currently needed...    Putting on and taking off regular lower body clothing?  1  -LE  --     Bathing (including washing, rinsing, and drying)  1  -LE  --     Toileting (which includes using toilet bed pan or urinal)  1  -LE  --     Putting on and taking off regular upper body clothing  1  -LE  --     Taking care of personal grooming (such as brushing teeth)  2  -LE  --     Eating meals  1  -LE  --     Score  7  -LE  --        Modified [place] Scale    Modified [place] Scale  5 - Severe disability.  Bedridden, incontinent, and requiring constant nursing care and attention.  -LE  --        Functional Assessment    Outcome Measure Options  AM-PAC 6 Clicks Daily Activity (OT)  -LE  AM-PAC 6 Clicks Basic Mobility (PT)  -PC       User Key  (r) = Recorded By, (t) = Taken By, (c) = Cosigned By    Initials Name Provider Type    Jasmin Romero, OTR Occupational Therapist    Giovana Dc, PT Physical Therapist    GENOVEVA Harp  Sylvie Dorsey PTA Physical Therapy Assistant    Emma Serrano, PT Physical Therapist         Time Calculation:   PT Charges     Row Name 02/02/19 1551 02/02/19 1532          Time Calculation    Start Time  1505  -SM  --     Stop Time  1523  -SM  --     Time Calculation (min)  18 min  -  --     PT Received On  02/02/19  -  --     PT - Next Appointment  02/03/19  -  02/03/19  -       User Key  (r) = Recorded By, (t) = Taken By, (c) = Cosigned By    Initials Name Provider Type    Sylvie Hernandez PTA Physical Therapy Assistant        Therapy Suggested Charges     Code   Minutes Charges    None           Therapy Charges for Today     Code Description Service Date Service Provider Modifiers Qty    23309563660 HC PT THER PROC EA 15 MIN 2/2/2019 Sylvie Harp PTA GP 1    52601604601 HC PT THER SUPP EA 15 MIN 2/2/2019 Sylvie Harp PTA GP 1          PT G-Codes  Outcome Measure Options: AM-PAC 6 Clicks Basic Mobility (PT)  AM-PAC 6 Clicks Score: 16  Score: 10  Modified [place] Scale: 5 - Severe disability.  Bedridden, incontinent, and requiring constant nursing care and attention.    Sylvie Harp PTA  2/2/2019

## 2019-02-02 NOTE — PROGRESS NOTES
LOS: 5 days   Patient Care Team:  Provider, No Known as PCP - General    Subjective     Patient reports she is doing better she got cleared to use a walker and go to the bathroom by herself.  She is looking forward to getting out and getting rehabilitation done she was doing quickly her biggest concern is the financial constraints she is on a very fixed income.    Review of Systems:          Objective     Vital Signs  Vital Sign Min/Max for last 24 hours  Temp  Min: 97.4 °F (36.3 °C)  Max: 98.6 °F (37 °C)   BP  Min: 149/88  Max: 172/98   Pulse  Min: 69  Max: 82   Resp  Min: 16  Max: 18   SpO2  Min: 94 %  Max: 96 %   No Data Recorded   No Data Recorded        Ventilator/Non-Invasive Ventilation Settings (From admission, onward)    None                       Body mass index is 20.23 kg/m².  I/O last 3 completed shifts:  In: -   Out: 950 [Urine:950]  No intake/output data recorded.        Physical Exam:  General Appearance: Well-developed elderly white female resting in bed she is a little hard of hearing she doesn't appear in any acute distress  Eyes: Conjunctiva are clear and anicteric pupils are equal  ENT: Mucous membranes are moist no erythema or exudates don't appreciate any facial asymmetry  Neck: Trachea Midline no palpable adenopathy or thyromegaly  Lungs: Clear nonlabored symmetric expansion  Cardiac: Regular rate and rhythm no murmur  Abdomen: Soft no palpable organomegaly or masses  : Not examined  Musculoskeletal: Grossly normal  Skin: No jaundice, no rashes, no petechiae  Neuro: She is alert oriented cooperative following commands well she has good  both right and left in pretty good dorsiflexion and plantarflexion the left is a little stronger than the right.  She is able to lift and hold her right leg up although she has a little bit of coordination difficulties sort of drifts around the same with her right arm she is able to hold but she has a little bit of conservative of revolving  drift.  Not Present on the left.  Extremities/P Vascular: No clubbing no cyanosis no edema palpable radial dorsalis pedis pulses bilaterally  MSE: Seems to be in very good spirits very pleasant engaged.       Labs:  Results from last 7 days   Lab Units 01/31/19  0353 01/29/19 2209 01/28/19 2033 01/28/19 2025   GLUCOSE mg/dL 101* 95  --  115*   SODIUM mmol/L 139 139  --  139   POTASSIUM mmol/L 3.6 3.5  --  3.1*   CO2 mmol/L 20.5* 19.8*  --  22.3   CHLORIDE mmol/L 105 106  --  102   ANION GAP mmol/L 13.5 13.2  --  14.7   CREATININE mg/dL 0.55* 0.62 0.90 0.93   BUN mg/dL 7* 8  --  13   BUN / CREAT RATIO  12.7 12.9  --  14.0   CALCIUM mg/dL 8.6 8.7  --  9.4   EGFR IF NONAFRICN AM mL/min/1.73 108 94  --  59*   ALK PHOS U/L  --   --   --  78   TOTAL PROTEIN g/dL  --   --   --  6.5   ALT (SGPT) U/L  --   --   --  14   AST (SGOT) U/L  --   --   --  19   BILIRUBIN mg/dL  --   --   --  0.6   ALBUMIN g/dL  --   --   --  4.20   GLOBULIN gm/dL  --   --   --  2.3     Estimated Creatinine Clearance: 62.3 mL/min (A) (by C-G formula based on SCr of 0.55 mg/dL (L)).      Results from last 7 days   Lab Units 01/28/19 2025   WBC 10*3/mm3 6.45   RBC 10*6/mm3 4.84   HEMOGLOBIN g/dL 16.1*   HEMATOCRIT % 45.4   MCV fL 93.8   MCH pg 33.3*   MCHC g/dL 35.5   RDW % 13.2*   RDW-SD fl 45.6   MPV fL 9.1   PLATELETS 10*3/mm3 244   NEUTROPHIL % % 55.4   LYMPHOCYTE % % 26.5   MONOCYTES % % 13.6*   EOSINOPHIL % % 3.7   BASOPHIL % % 0.8   IMM GRAN % % 0.2   NEUTROS ABS 10*3/mm3 3.57   LYMPHS ABS 10*3/mm3 1.71   MONOS ABS 10*3/mm3 0.88   EOS ABS 10*3/mm3 0.24   BASOS ABS 10*3/mm3 0.05   IMMATURE GRANS (ABS) 10*3/mm3 0.01         Results from last 7 days   Lab Units 01/28/19 2025   TROPONIN T ng/mL <0.010         Results from last 7 days   Lab Units 01/28/19 2025   TSH mIU/mL 1.120         Results from last 7 days   Lab Units 01/28/19 2025   INR  1.03     Microbiology Results (last 10 days)     ** No results found for the last 240 hours. **                 aspirin 81 mg Oral Daily   aspirin 300 mg Rectal Q24H   atorvastatin 40 mg Oral Nightly   clopidogrel 75 mg Oral Daily   cyanocobalamin 1,000 mcg Intramuscular Daily   levothyroxine 125 mcg Oral Daily   multivitamin 1 tablet Oral Daily   nicotine 1 patch Transdermal Q24H   [START ON 2/4/2019] vitamin B-12 1,000 mcg Oral Daily       sodium chloride 75 mL/hr Last Rate: 75 mL/hr (01/31/19 8579)       Diagnostics:  Ct Angiogram Head With & Without Contrast    Result Date: 1/29/2019  EMERGENCY CONTRAST ENHANCED CT ANGIOGRAM OF THE HEAD AND NECK AND CT PERFUSION STUDY OF THE HEAD 01/28/2019  CLINICAL HISTORY: Acute onset slurred speech, right-sided weakness and facial droop.  Noncontrast head CT technique: Spiral CT images were obtained from the base of the skull to vertex without intravenous contrast and images were reformatted and submitted in 3 mm thick axial CT section with brain algorithm and 2 mm thick sagittal and coronal reconstructions were performed with brain algorithm.  FINDINGS: There are patchy areas of low density extending from the periventricular and subcortical white matter of the cerebral hemispheres consistent with mild/moderate small vessel disease. There is a 3 x 2 cm area of encephalomalacia compatible with an old infarct in posterior inferomedial left cerebellum and left PICA territory. The remainder of the brain parenchyma is normal in attenuation. The ventricles are normal in size. I see no midline shift. No extra-axial fluid collections are identified. There is densely calcified mass that is extra-axial and dural based overlying the inferior lateral left frontal lobe and left frontotemporal operculum measuring 2.6 x 1.8 x 2.2 cm in anterior posterior and medial lateral craniocaudal dimension most consistent with a densely calcified meningioma. The paranasal sinuses and the mastoid air cells and middle ear cavities are clear.      1. 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 2. There is densely calcified 2.5 x 1.8 x 2.2 cm meningioma overlying the inferior lateral left frontal lobe and left frontotemporal operculum. The remainder of the head CT is normal. The results were communicated to Dr. Falk from stroke neurology on 01/28/2019 at 8:33 PM and he requested a CT angiogram of the head and neck and CT perfusion study of the head.  CT angiogram head and neck and CT perfusion study technique: Spiral CT images were obtained through the head during arterial phase of contrast and images were reformatted and analyzed with rapid software analysis for CT perfusion study of the head and subsequently spiral CT images were obtained from the top of the aortic arch up through the great vessels of the head and neck during arterial phase of contrast and images were reformatted and submitted in 1 mm thick axial sagittal and coronal CT sections with soft tissue algorithm and additional 3D reconstructions were performed to complete a CT angiogram of the head and neck and finally spiral CT images were obtained from the base of the skull to the vertex delayed following intravenous contrast and these images were reformatted and submitted in 3 mm thick axial CT section with brain algorithm.  CT perfusion study: The CT perfusion images demonstrate no areas of reduced cerebral blood flow to less than 30% with no convincing areas of acute completed infarction in the field of view. Furthermore there are no areas of delayed time to maximal enhancement of greater than 6 seconds with no convincing focal hypoperfused areas seen in the brain.  CT angiogram of the neck: The nasopharynx, oropharynx, hypopharynx, true cords and subglottic airway are normal in appearance. The lung apices are clear. The parotid  parapharyngeal and submandibular spaces are symmetric and normal in appearance. Cervical  spondylosis is present, disc space narrowing and degenerative disc and endplate changes C3-C7, some posterior endplate spurring and uncovertebral joint hypertrophy with mild canal and mild/moderate right foraminal narrowing C3-4, mild canal and mild right foraminal narrowing C4-5, mild canal and foraminal narrowing at C5-6 and C6-7. There is abnormal enlargement of the right neural foramen at C2-3 with mass extending from the right lateral epidural space through the right neural foramen at C2-3 that measures approximately 14 x 14 x 16 mm in size likely a neurofibroma. There is limited evaluation of the great vessel origins off the aortic arch due to beam hardening artifact off densely opacified left brachiocephalic vein. There appears to be a moderate stenosis of the origin and proximal left subclavian artery due to noncalcified atherosclerotic plaque and the distal left subclavian artery is normal in appearance. The left vertebral artery is suboptimally visualized and appears to be likely occluded from its origin to just anterior to the C3-4 interspace level where there is an ascending cervical collateral that aids in reconstituting a tiny diameter diseased left vertebral artery from the C3 cervical level to just above the C1 ring where there is another collateral that hooks into the back wall the upper cervical left vertebral artery and then the left vertebral arteries is patent to the vertebrobasilar junction although there may be an additional moderate stenosis of the distal intracranial segment of the left vertebral artery just proximal to the vertebrobasilar junction. No stenosis seen in the left common carotid artery, its bifurcation and left internal and external carotid arteries within normal limits and no stenosis is seen in the left internal carotid arteries using the NASCET criteria. The brachycephalic artery origin is normal in appearance. No stenosis seen in the brachycephalic artery, its bifurcation  into the right common carotid artery and subclavian arteries are normal in appearance. No stenosis in the right clavian artery origin. The right vertebral artery origin is normal in appearance. The right vertebral artery is the dominant vertebral artery and is widely patent from its origin and the vertebral basilar junction without stenosis. The right common carotid origin is normal in appearance. No stenosis seen in the right common carotid artery, its bifurcation into the right internal and external carotid arteries is within normal limits. No stenosis seen in the right internal carotid artery using the NASCET criteria.  CT angiogram of the head: CT angiogram images through the head demonstrate tiny diameter intracranial segment of the left vertebral artery that appears to be patent and gives off the left posterior inferior cerebellar artery and post PICA segments very tiny in diameter but patent to the vertebrobasilar junction dominant right vertebral artery is widely patent to the vertebrobasilar junction, the basilar artery and basilar tips within normal limits. The posterior cerebral, superior cerebellar arteries are within normal limits. The upper cervical petrous cavernous and supracavernous segments of the internal carotid arteries are within normal limits. The anterior cerebral arteries are within normal limits. There is some nodular prominence at the level of the anterior communicating artery origin measuring 2.6 mm in size probably a junctional infundibulum rather than aneurysm. The M1 segments and middle cerebral arteries and middle cerebral artery trifurcations are within normal limits.  IMPRESSION: 1. 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 There is also a 2.5 x 1.8 x 2.2 cm densely calcified meningioma overlying the inferior lateral left frontal lobe and  extending lateral to the left anterior frontotemporal operculum. 2. CT perfusion study demonstrates no convincing hypoperfused areas or acute completed infarction in the field of view in the brain. If there remains clinical suspicion of an acute infarct, I recommend an MRI of the brain to further evaluate. 3. 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 4. The left vertebral artery is suboptimally assessed and may be occluded from its origin to the C3 cervical level where there is an ascending cervical collateral that reconstitutes a diseased small caliber left vertebral artery from the C3 cervical level to just above the C1 ring where there is another collateral that hooks into the back wall of the left vertebral artery just above the C1 ring and within the small diameter left vertebral artery is widely patent from C1 ring to its distal intracranial segment where there appears to be an additional moderate stenosis just proximal to the vertebrobasilar junction. There appears to be focal dilatation of the collateral branch just proximal to its reconstitution of the left vertebral artery at the level of inferior endplate of C3 where it goes from 1 to 2.5 mm in diameter best seen on axial CT images 130 through 132. This finding can be further assessed with an MRA of the neck. No additional stenosis is seen in the great vessels at the head or neck. Specifically, no stenosis is seen in the internal carotid arteries using the NASCET criteria. The final dictated report was communicated to Dr. Falk from stroke neurology by  telephone on 01/28/2019 at 8:55 PM and also to Dr. Pineda in the emergency room 01/28/2019 at 9:30 PM.  Radiation dose reduction techniques were utilized, including automated exposure control and exposure modulation based on body size.  This report was finalized on 1/29/2019 10:16 AM by Dr. Ludin Stout M.D.      Ct Head Without Contrast    Result Date: 1/29/2019  CT OF THE HEAD WITHOUT CONTRAST  HISTORY: Stroke  COMPARISON: July 28, 2019  TECHNIQUE: Axial CT imaging was obtained from the vertex of the skull through the skull base. No IV contrast was administered.  FINDINGS: No IV contrast was administered for this examination, although the patient did receive contrast for prior stroke protocol. No obvious acute intracranial hemorrhage is identified, but the exam is degraded by the presence of intravenous contrast material. The ventricles are normal in size. Patient is noted to have a large calcified left frontal meningioma. This is not associated with any midline shift or mass effect. There is some mild periventricular deep white matter microangiopathic change. There is an old left cerebellar infarct, as well as an area of decreased attenuation seen within the left basal ganglia, which is also favored to be chronic. No new areas of decreased attenuation are identified. There is no evidence of venous occlusion. Mucosal thickening is seen within the ethmoid sinuses. Mastoid air cells appear clear. No focal soft tissue abnormalities are seen.      No acute intracranial process identified. If symptoms persist, correlation with MRI suggested.  Radiation dose reduction techniques were utilized, including automated exposure control and exposure modulation based on body size.  This report was finalized on 1/29/2019 1:27 AM by Dr. Kadi Gomez M.D.      Ct Angiogram Neck With & Without Contrast    Result Date: 1/29/2019  EMERGENCY CONTRAST ENHANCED CT ANGIOGRAM OF THE HEAD AND NECK AND CT PERFUSION STUDY OF THE HEAD  01/28/2019  CLINICAL HISTORY: Acute onset slurred speech, right-sided weakness and facial droop.  Noncontrast head CT technique: Spiral CT images were obtained from the base of the skull to vertex without intravenous contrast and images were reformatted and submitted in 3 mm thick axial CT section with brain algorithm and 2 mm thick sagittal and coronal reconstructions were performed with brain algorithm.  FINDINGS: There are patchy areas of low density extending from the periventricular and subcortical white matter of the cerebral hemispheres consistent with mild/moderate small vessel disease. There is a 3 x 2 cm area of encephalomalacia compatible with an old infarct in posterior inferomedial left cerebellum and left PICA territory. The remainder of the brain parenchyma is normal in attenuation. The ventricles are normal in size. I see no midline shift. No extra-axial fluid collections are identified. There is densely calcified mass that is extra-axial and dural based overlying the inferior lateral left frontal lobe and left frontotemporal operculum measuring 2.6 x 1.8 x 2.2 cm in anterior posterior and medial lateral craniocaudal dimension most consistent with a densely calcified meningioma. The paranasal sinuses and the mastoid air cells and middle ear cavities are clear.      1. 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 2. There is densely calcified 2.5 x 1.8 x 2.2 cm meningioma overlying the inferior lateral left frontal lobe and left frontotemporal operculum. The remainder of the head CT is normal. The results were communicated to Dr. Falk from stroke neurology on 01/28/2019 at 8:33 PM and he requested a CT angiogram of the head and neck and CT perfusion study of the head.  CT angiogram head and neck and CT perfusion study technique: Spiral CT images were obtained through the head during arterial  phase of contrast and images were reformatted and analyzed with rapid software analysis for CT perfusion study of the head and subsequently spiral CT images were obtained from the top of the aortic arch up through the great vessels of the head and neck during arterial phase of contrast and images were reformatted and submitted in 1 mm thick axial sagittal and coronal CT sections with soft tissue algorithm and additional 3D reconstructions were performed to complete a CT angiogram of the head and neck and finally spiral CT images were obtained from the base of the skull to the vertex delayed following intravenous contrast and these images were reformatted and submitted in 3 mm thick axial CT section with brain algorithm.  CT perfusion study: The CT perfusion images demonstrate no areas of reduced cerebral blood flow to less than 30% with no convincing areas of acute completed infarction in the field of view. Furthermore there are no areas of delayed time to maximal enhancement of greater than 6 seconds with no convincing focal hypoperfused areas seen in the brain.  CT angiogram of the neck: The nasopharynx, oropharynx, hypopharynx, true cords and subglottic airway are normal in appearance. The lung apices are clear. The parotid  parapharyngeal and submandibular spaces are symmetric and normal in appearance. Cervical spondylosis is present, disc space narrowing and degenerative disc and endplate changes C3-C7, some posterior endplate spurring and uncovertebral joint hypertrophy with mild canal and mild/moderate right foraminal narrowing C3-4, mild canal and mild right foraminal narrowing C4-5, mild canal and foraminal narrowing at C5-6 and C6-7. There is abnormal enlargement of the right neural foramen at C2-3 with mass extending from the right lateral epidural space through the right neural foramen at C2-3 that measures approximately 14 x 14 x 16 mm in size likely a neurofibroma. There is limited evaluation  of the great vessel origins off the aortic arch due to beam hardening artifact off densely opacified left brachiocephalic vein. There appears to be a moderate stenosis of the origin and proximal left subclavian artery due to noncalcified atherosclerotic plaque and the distal left subclavian artery is normal in appearance. The left vertebral artery is suboptimally visualized and appears to be likely occluded from its origin to just anterior to the C3-4 interspace level where there is an ascending cervical collateral that aids in reconstituting a tiny diameter diseased left vertebral artery from the C3 cervical level to just above the C1 ring where there is another collateral that hooks into the back wall the upper cervical left vertebral artery and then the left vertebral arteries is patent to the vertebrobasilar junction although there may be an additional moderate stenosis of the distal intracranial segment of the left vertebral artery just proximal to the vertebrobasilar junction. No stenosis seen in the left common carotid artery, its bifurcation and left internal and external carotid arteries within normal limits and no stenosis is seen in the left internal carotid arteries using the NASCET criteria. The brachycephalic artery origin is normal in appearance. No stenosis seen in the brachycephalic artery, its bifurcation into the right common carotid artery and subclavian arteries are normal in appearance. No stenosis in the right clavian artery origin. The right vertebral artery origin is normal in appearance. The right vertebral artery is the dominant vertebral artery and is widely patent from its origin and the vertebral basilar junction without stenosis. The right common carotid origin is normal in appearance. No stenosis seen in the right common carotid artery, its bifurcation into the right internal and external carotid arteries is within normal limits. No stenosis seen in the right internal carotid artery  using the NASCET criteria.  CT angiogram of the head: CT angiogram images through the head demonstrate tiny diameter intracranial segment of the left vertebral artery that appears to be patent and gives off the left posterior inferior cerebellar artery and post PICA segments very tiny in diameter but patent to the vertebrobasilar junction dominant right vertebral artery is widely patent to the vertebrobasilar junction, the basilar artery and basilar tips within normal limits. The posterior cerebral, superior cerebellar arteries are within normal limits. The upper cervical petrous cavernous and supracavernous segments of the internal carotid arteries are within normal limits. The anterior cerebral arteries are within normal limits. There is some nodular prominence at the level of the anterior communicating artery origin measuring 2.6 mm in size probably a junctional infundibulum rather than aneurysm. The M1 segments and middle cerebral arteries and middle cerebral artery trifurcations are within normal limits.  IMPRESSION: 1. 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 There is also a 2.5 x 1.8 x 2.2 cm densely calcified meningioma overlying the inferior lateral left frontal lobe and extending lateral to the left anterior frontotemporal operculum. 2. CT perfusion study demonstrates no convincing hypoperfused areas or acute completed infarction in the field of view in the brain. If there remains clinical suspicion of an acute infarct, I recommend an MRI of the brain to further evaluate. 3. 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 4. The left vertebral artery is suboptimally assessed and may be occluded from its origin to the C3 cervical level where there is an ascending cervical collateral that reconstitutes a diseased small caliber left vertebral artery from the C3 cervical level to just above the C1 ring where there is another collateral that hooks into the back wall of the left vertebral artery just above the C1 ring and within the small diameter left vertebral artery is widely patent from C1 ring to its distal intracranial segment where there appears to be an additional moderate stenosis just proximal to the vertebrobasilar junction. There appears to be focal dilatation of the collateral branch just proximal to its reconstitution of the left vertebral artery at the level of inferior endplate of C3 where it goes from 1 to 2.5 mm in diameter best seen on axial CT images 130 through 132. This finding can be further assessed with an MRA of the neck. No additional stenosis is seen in the great vessels at the head or neck. Specifically, no stenosis is seen in the internal carotid arteries using the NASCET criteria. The final dictated report was communicated to Dr. Falk from stroke neurology by telephone on 01/28/2019 at 8:55 PM and also to Dr. Pineda in the emergency room 01/28/2019 at 9:30 PM.  Radiation dose reduction techniques were utilized, including automated exposure control and exposure modulation based on body size.  This report was finalized on 1/29/2019 10:16 AM by Dr. Ludin Stout M.D.      Mri Brain Without Contrast    Addendum Date: 1/29/2019    Findings were relayed to the patient's nurse at 10:36 PM on January 29, 2019.  This report was finalized on 1/29/2019 10:37 PM by Dr. Kadi Gomez M.D.      Result Date: 1/29/2019  BRAIN MRI WITHOUT CONTRAST  HISTORY:  Stroke  COMPARISON: None.  FINDINGS:  Multiplanar images of the head were obtained without gadolinium. Study is positive for an area of restricted diffusion within the posterior limb of the left internal capsule and left corona radiata. Maximum dimensions are 1.4 x 1.0 cm. No additional areas of restricted diffusion are seen. The patient does have an area of encephalomalacia within the left cerebellar hemisphere. There is no evidence of hemorrhagic transformation on gradient echo images. Patient's peripherally calcified left frontal meningioma is again noted. It measures up to 2.4 x 1.7 cm. There is diffuse cerebral atrophy, in keeping with the age of 74. There is periventricular and deep white matter microangiopathic change. There is no midline shift or mass effect. Mild mucosal thickening is seen within the ethmoid sinuses. Old lacunar infarct is seen within the left basal ganglia. Intracranial flow-voids appear intact      1. Study is positive for area of acute infarct within the posterior limb of the left internal capsule and left corona radiata. There is no associated midline shift or mass effect. No additional areas of restricted diffusion are identified.  This report was finalized on 1/29/2019 9:21 PM by Dr. Kadi Gomez M.D.      Xr Chest 1 View    Result Date: 1/28/2019  PORTABLE CHEST RADIOGRAPH  HISTORY: Acute stroke protocol  COMPARISON: None available.  FINDINGS: Heart size is within normal limits for portable technique. There is tortuosity of the thoracic aorta. No pneumothorax or pleural effusion is seen. Patient is noted to have bibasilar atelectasis.      Bibasilar atelectasis.  This report was finalized on 1/28/2019 9:42 PM by Dr. Kadi Gomez M.D.      Xr Spine Lumbar 1 View    Result Date: 1/29/2019  XR SPINE LUMBAR 1 VW-  INDICATIONS:    Back pain  TECHNIQUE: A FRONTAL VIEW OF THE LUMBAR SPINE  COMPARISON: None available  FINDINGS:  The image is limited by overpenetration. S-shaped  scoliosis of the thoracolumbar spine is seen. No obvious displaced fracture is noted on this single view. Multilevel endplate spurring is apparent. There appears to be disc space narrowing at L1-L3, although suboptimally characterized with this technique. Arterial calcifications are seen. Further evaluation can be obtained as indicated.       As described.  This report was finalized on 1/29/2019 2:37 PM by Dr. Jj Richardson M.D.      Fl Video Swallow    Result Date: 1/31/2019  VIDEO SWALLOW STUDY  HISTORY: Dysphagia.  3 minutes 20 seconds fluoroscopy was provided for the speech pathologist during a video swallow study. 4,069 images were saved.  Laryngeal penetration and aspiration were observed during the exam.      Fluoroscopy was provided for the speech pathologist during a video swallow study. For full details please see the speech pathology report.  This report was finalized on 1/31/2019 6:28 PM by Dr. Raf Uriarte M.D.      Ct Cerebral Perfusion With & Without Contrast    Result Date: 1/29/2019  EMERGENCY CONTRAST ENHANCED CT ANGIOGRAM OF THE HEAD AND NECK AND CT PERFUSION STUDY OF THE HEAD 01/28/2019  CLINICAL HISTORY: Acute onset slurred speech, right-sided weakness and facial droop.  Noncontrast head CT technique: Spiral CT images were obtained from the base of the skull to vertex without intravenous contrast and images were reformatted and submitted in 3 mm thick axial CT section with brain algorithm and 2 mm thick sagittal and coronal reconstructions were performed with brain algorithm.  FINDINGS: There are patchy areas of low density extending from the periventricular and subcortical white matter of the cerebral hemispheres consistent with mild/moderate small vessel disease. There is a 3 x 2 cm area of encephalomalacia compatible with an old infarct in posterior inferomedial left cerebellum and left PICA territory. The remainder of the brain parenchyma is normal in attenuation. The ventricles are  normal in size. I see no midline shift. No extra-axial fluid collections are identified. There is densely calcified mass that is extra-axial and dural based overlying the inferior lateral left frontal lobe and left frontotemporal operculum measuring 2.6 x 1.8 x 2.2 cm in anterior posterior and medial lateral craniocaudal dimension most consistent with a densely calcified meningioma. The paranasal sinuses and the mastoid air cells and middle ear cavities are clear.      1. 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 2. There is densely calcified 2.5 x 1.8 x 2.2 cm meningioma overlying the inferior lateral left frontal lobe and left frontotemporal operculum. The remainder of the head CT is normal. The results were communicated to Dr. Falk from stroke neurology on 01/28/2019 at 8:33 PM and he requested a CT angiogram of the head and neck and CT perfusion study of the head.  CT angiogram head and neck and CT perfusion study technique: Spiral CT images were obtained through the head during arterial phase of contrast and images were reformatted and analyzed with rapid software analysis for CT perfusion study of the head and subsequently spiral CT images were obtained from the top of the aortic arch up through the great vessels of the head and neck during arterial phase of contrast and images were reformatted and submitted in 1 mm thick axial sagittal and coronal CT sections with soft tissue algorithm and additional 3D reconstructions were performed to complete a CT angiogram of the head and neck and finally spiral CT images were obtained from the base of the skull to the vertex delayed following intravenous contrast and these images were reformatted and submitted in 3 mm thick axial CT section with brain algorithm.  CT perfusion study: The CT perfusion images demonstrate no areas of reduced cerebral blood flow  to less than 30% with no convincing areas of acute completed infarction in the field of view. Furthermore there are no areas of delayed time to maximal enhancement of greater than 6 seconds with no convincing focal hypoperfused areas seen in the brain.  CT angiogram of the neck: The nasopharynx, oropharynx, hypopharynx, true cords and subglottic airway are normal in appearance. The lung apices are clear. The parotid  parapharyngeal and submandibular spaces are symmetric and normal in appearance. Cervical spondylosis is present, disc space narrowing and degenerative disc and endplate changes C3-C7, some posterior endplate spurring and uncovertebral joint hypertrophy with mild canal and mild/moderate right foraminal narrowing C3-4, mild canal and mild right foraminal narrowing C4-5, mild canal and foraminal narrowing at C5-6 and C6-7. There is abnormal enlargement of the right neural foramen at C2-3 with mass extending from the right lateral epidural space through the right neural foramen at C2-3 that measures approximately 14 x 14 x 16 mm in size likely a neurofibroma. There is limited evaluation of the great vessel origins off the aortic arch due to beam hardening artifact off densely opacified left brachiocephalic vein. There appears to be a moderate stenosis of the origin and proximal left subclavian artery due to noncalcified atherosclerotic plaque and the distal left subclavian artery is normal in appearance. The left vertebral artery is suboptimally visualized and appears to be likely occluded from its origin to just anterior to the C3-4 interspace level where there is an ascending cervical collateral that aids in reconstituting a tiny diameter diseased left vertebral artery from the C3 cervical level to just above the C1 ring where there is another collateral that hooks into the back wall the upper cervical left vertebral artery and then the left vertebral arteries is patent to the vertebrobasilar  junction although there may be an additional moderate stenosis of the distal intracranial segment of the left vertebral artery just proximal to the vertebrobasilar junction. No stenosis seen in the left common carotid artery, its bifurcation and left internal and external carotid arteries within normal limits and no stenosis is seen in the left internal carotid arteries using the NASCET criteria. The brachycephalic artery origin is normal in appearance. No stenosis seen in the brachycephalic artery, its bifurcation into the right common carotid artery and subclavian arteries are normal in appearance. No stenosis in the right clavian artery origin. The right vertebral artery origin is normal in appearance. The right vertebral artery is the dominant vertebral artery and is widely patent from its origin and the vertebral basilar junction without stenosis. The right common carotid origin is normal in appearance. No stenosis seen in the right common carotid artery, its bifurcation into the right internal and external carotid arteries is within normal limits. No stenosis seen in the right internal carotid artery using the NASCET criteria.  CT angiogram of the head: CT angiogram images through the head demonstrate tiny diameter intracranial segment of the left vertebral artery that appears to be patent and gives off the left posterior inferior cerebellar artery and post PICA segments very tiny in diameter but patent to the vertebrobasilar junction dominant right vertebral artery is widely patent to the vertebrobasilar junction, the basilar artery and basilar tips within normal limits. The posterior cerebral, superior cerebellar arteries are within normal limits. The upper cervical petrous cavernous and supracavernous segments of the internal carotid arteries are within normal limits. The anterior cerebral arteries are within normal limits. There is some nodular prominence at the level of the anterior communicating artery  origin measuring 2.6 mm in size probably a junctional infundibulum rather than aneurysm. The M1 segments and middle cerebral arteries and middle cerebral artery trifurcations are within normal limits.  IMPRESSION: 1. 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 There is also a 2.5 x 1.8 x 2.2 cm densely calcified meningioma overlying the inferior lateral left frontal lobe and extending lateral to the left anterior frontotemporal operculum. 2. CT perfusion study demonstrates no convincing hypoperfused areas or acute completed infarction in the field of view in the brain. If there remains clinical suspicion of an acute infarct, I recommend an MRI of the brain to further evaluate. 3. 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 4. The left vertebral artery is suboptimally assessed and may be occluded from its origin to the C3 cervical level where there is an ascending cervical collateral that reconstitutes a diseased small caliber left vertebral artery from the C3 cervical level to just above the C1 ring where there is another collateral that hooks into the back wall of the left vertebral artery just above the C1 ring and within the small diameter left vertebral artery is widely patent from C1 ring to its distal intracranial segment where there appears to be an additional moderate stenosis just proximal to the vertebrobasilar junction. There appears to be focal dilatation of the  collateral branch just proximal to its reconstitution of the left vertebral artery at the level of inferior endplate of C3 where it goes from 1 to 2.5 mm in diameter best seen on axial CT images 130 through 132. This finding can be further assessed with an MRA of the neck. No additional stenosis is seen in the great vessels at the head or neck. Specifically, no stenosis is seen in the internal carotid arteries using the NASCET criteria. The final dictated report was communicated to Dr. Falk from stroke neurology by telephone on 01/28/2019 at 8:55 PM and also to Dr. Pineda in the emergency room 01/28/2019 at 9:30 PM.  Radiation dose reduction techniques were utilized, including automated exposure control and exposure modulation based on body size.  This report was finalized on 1/29/2019 10:16 AM by Dr. Ludin Stout M.D.      Results for orders placed during the hospital encounter of 01/28/19   Adult Transthoracic Echo Complete W/ Cont if Necessary Per Protocol    Narrative · Left ventricular systolic function is normal. Calculated EF = 65%.   Estimated EF was in agreement with the calculated EF. Estimated EF = 65%.   Normal left ventricular cavity size noted. All left ventricular wall   segments contract normally. Left ventricular wall thickness is consistent   with mild concentric hypertrophy. Left ventricular diastolic dysfunction   is noted (grade I) consistent with impaired relaxation.  · Normal left atrial volume noted. Saline test results are negative.  · Severe MAC is present. Mild mitral valve regurgitation is present.  · Trace tricuspid valve regurgitation is present. Estimated right   ventricular systolic pressure from tricuspid regurgitation is normal (<35   mmHg). Calculated right ventricular systolic pressure from tricuspid   regurgitation is 20 mmHg.              Active Hospital Problems    Diagnosis Date Noted   • Acute CVA (cerebrovascular accident) (CMS/[facility]) [I63.9] 01/28/2019      Resolved  Hospital Problems   No resolved problems to display.         Assessment/Plan     1. Left internal capsule ischemic CVA status post TPA workup completed except she'll need outpatient rhythm monitoring( zio patch) and neurology recommends dual antiplatelet therapy aspirin and Plavix for 3 months and then single agent Lipitor 40 mg daily  2. Right hemiparesis secondary to #1  3. Oral pharyngeal dysphagia secondary to #1 on dysphagia diet  4.  hypertension blood pressure was remaining a little high we'll have to increase medications and gradually bring this down  5. Hypothyroidism on replacement  6. Incidental meningioma  7. 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  tobacco abuse needs to stop smoking.    Plan for disposition: Patient is medically ready awaiting discharge planning    Ady Gomez MD  02/02/19  6:45 PM    Time:

## 2019-02-02 NOTE — PLAN OF CARE
Problem: Fall Risk (Adult)  Goal: Absence of Fall  Outcome: Ongoing (interventions implemented as appropriate)      Problem: Skin Injury Risk (Adult)  Goal: Skin Health and Integrity  Outcome: Ongoing (interventions implemented as appropriate)      Problem: Patient Care Overview  Goal: Plan of Care Review  Outcome: Ongoing (interventions implemented as appropriate)   02/02/19 0556   Plan of Care Review   Progress improving   OTHER   Outcome Summary Free from falls during this shift and denies chest pain or soa. Ativan given during the night for anxiety. Pt did c/o of GI upset this morning. Pt was incontinent several times during the night. NIH score 5.   Coping/Psychosocial   Plan of Care Reviewed With patient

## 2019-02-02 NOTE — PLAN OF CARE
Problem: Patient Care Overview  Goal: Plan of Care Review  Outcome: Ongoing (interventions implemented as appropriate)   02/02/19 2684   Plan of Care Review   Progress improving   OTHER   Outcome Summary Pt tolerated treatment well this date. Increased gait distance to 100ft w/ Rw and min A x2. Demostrated 1 LOB requiring mod A to recover. Pt continues to demonstrate decreased heelstrike on R LE while ambulating. Discussed several exercises to perform throughout the day. PT will continue to address functional mobility deficits as tolerated.   Coping/Psychosocial   Plan of Care Reviewed With patient

## 2019-02-03 PROCEDURE — 25010000002 CYANOCOBALAMIN PER 1000 MCG: Performed by: NURSE PRACTITIONER

## 2019-02-03 PROCEDURE — 97116 GAIT TRAINING THERAPY: CPT | Performed by: PHYSICAL THERAPIST

## 2019-02-03 RX ADMIN — ATORVASTATIN CALCIUM 40 MG: 20 TABLET, FILM COATED ORAL at 20:22

## 2019-02-03 RX ADMIN — CYANOCOBALAMIN 1000 MCG: 1000 INJECTION, SOLUTION INTRAMUSCULAR; SUBCUTANEOUS at 10:02

## 2019-02-03 RX ADMIN — Medication 1 TABLET: at 10:01

## 2019-02-03 RX ADMIN — NICOTINE 1 PATCH: 21 PATCH, EXTENDED RELEASE TRANSDERMAL at 10:02

## 2019-02-03 RX ADMIN — LEVOTHYROXINE SODIUM 125 MCG: 125 TABLET ORAL at 10:01

## 2019-02-03 RX ADMIN — AMLODIPINE BESYLATE 5 MG: 5 TABLET ORAL at 10:01

## 2019-02-03 RX ADMIN — CLOPIDOGREL 75 MG: 75 TABLET, FILM COATED ORAL at 10:01

## 2019-02-03 RX ADMIN — ASPIRIN 81 MG: 81 TABLET, CHEWABLE ORAL at 10:01

## 2019-02-03 NOTE — PLAN OF CARE
Problem: Patient Care Overview  Goal: Plan of Care Review  Outcome: Ongoing (interventions implemented as appropriate)   02/03/19 1048   Plan of Care Review   Progress improving   OTHER   Outcome Summary Pt transferred supine to sit with supervision and use of bedrails. Pt performed sit to stand with RW and sBA x 1. Pt ambulated 250 ft with CGA x 1 and RW. Pt returned to sit EOB. No LOB during ambulation. Mild ataxic gait with RLE   Coping/Psychosocial   Plan of Care Reviewed With patient;daughter

## 2019-02-03 NOTE — THERAPY TREATMENT NOTE
Acute Care - Physical Therapy Treatment Note  [facility]     Patient Name: Sommer Smith  : 1945  MRN: [id number]  Today's Date: 2/3/2019        Referring Physician: Blossom    Admit Date: 2019    Visit Dx:    ICD-10-CM ICD-9-CM   1. Acute CVA (cerebrovascular accident) (CMS/[facility]) I63.9 434.91   2. Decreased mobility R26.89 781.99     Patient Active Problem List   Diagnosis   • Acute CVA (cerebrovascular accident) (CMS/[facility])       Therapy Treatment    Rehabilitation Treatment Summary     Row Name 19 1034             Treatment Time/Intention    Discipline  physical therapist  -      Document Type  therapy note (daily note)  -      Subjective Information  complains of;weakness;fatigue  -      Mode of Treatment  physical therapy  -      Patient/Family Observations  awake, alert, fowlers  -      Care Plan Review  evaluation/treatment results reviewed;care plan/treatment goals reviewed;risks/benefits reviewed;current/potential barriers reviewed;patient/other agree to care plan  -      Care Plan Review, Other Participant(s)  daughter  -      Patient Effort  excellent  -      Existing Precautions/Restrictions  fall  -LC      Treatment Considerations/Comments  RLE weak  -LC      Recorded by [LC] Camron Guerrero, PT DPT 19 1047      Row Name 19 1034             Vital Signs    O2 Delivery Pre Treatment  room air  -LC      Recorded by [LC] Camron Guerrero, PT DPT 19 1047      Row Name 19 1034             Cognitive Assessment/Intervention- PT/OT    Orientation Status (Cognition)  oriented x 4  -LC      Follows Commands (Cognition)  WFL  -LC      Personal Safety Interventions  fall prevention program maintained;gait belt;muscle strengthening facilitated;nonskid shoes/slippers when out of bed  -LC      Recorded by [LC] Camron Guerrero, PT DPT 19 1047      Row Name 19 1034             Bed Mobility Assessment/Treatment    Supine-Sit [place] (Bed Mobility)   supervision  -      Sit-Supine [place] (Bed Mobility)  supervision  -      Bed Mobility, Safety Issues  decreased use of legs for bridging/pushing  -LC      Assistive Device (Bed Mobility)  bed rails;head of bed elevated  -LC      Recorded by [LC] Camron Guerrero, PT DPT 02/03/19 1047      Row Name 02/03/19 1034             Sit-Stand Transfer    Sit-Stand [place] (Transfers)  stand by assist  -LC      Assistive Device (Sit-Stand Transfers)  walker, front-wheeled  -LC      Recorded by [LC] Camron Guerrero PT DPT 02/03/19 1047      Row Name 02/03/19 1034             Stand-Sit Transfer    Stand-Sit [place] (Transfers)  stand by assist  -LC      Assistive Device (Stand-Sit Transfers)  walker, front-wheeled  -LC      Recorded by [LC] Camron Guerrero, PT DPT 02/03/19 1047      Row Name 02/03/19 1034             Gait/Stairs Assessment/Training    19531 - Gait Training Minutes   15  -LC      [place] Level (Gait)  contact guard  -      Assistive Device (Gait)  walker, front-wheeled  -LC      Distance in Feet (Gait)  250  -LC      Pattern (Gait)  step-through  -      Deviations/Abnormal Patterns (Gait)  right sided deviations;antalgic;shruthi decreased;stride length decreased  -LC      Right Sided Gait Deviations  heel strike decreased  -LC      Recorded by [LC] Camron Guerrero PT DPT 02/03/19 1047      Row Name 02/03/19 1034             Positioning and Restraints    Pre-Treatment Position  in bed  -LC      Post Treatment Position  bed  -LC      In Bed  sitting EOB;call light within reach;encouraged to call for assist;with family/caregiver;side rails up x2  -LC      Recorded by [LC] Camron Guerrero, PT DPT 02/03/19 1047        User Key  (r) = Recorded By, (t) = Taken By, (c) = Cosigned By    Initials Name Effective Dates Discipline    LC Camron Guererro, PT DPT 08/02/16 -  PT                   Physical Therapy Education     Title: PT OT SLP Therapies (Done)     Topic: Physical Therapy (Done)      Point: Mobility training (Done)     Learning Progress Summary           Patient Acceptance, E,D, VU,DU by SILAS at 2/3/2019 10:48 AM    Comment:  Safety during ambulation, benefit of RLE exercise    Acceptance, E,D, VU,NR by GENOVEVA at 2/2/2019  3:48 PM    Acceptance, E,TB, VU by KRISTEL at 2/1/2019 10:06 AM    Acceptance, E,TB,D, DU,NR by MITA at 2/1/2019  4:54 AM    Acceptance, D, DU,NR by WHITNEY at 1/31/2019 10:51 AM    Acceptance, E, VU,NR by MA at 1/30/2019  3:43 PM   Family Acceptance, E,D, VU,DU by SILAS at 2/3/2019 10:48 AM    Comment:  Safety during ambulation, benefit of RLE exercise                   Point: Home exercise program (Done)     Learning Progress Summary           Patient Acceptance, E,D, VU,DU by SILAS at 2/3/2019 10:48 AM    Comment:  Safety during ambulation, benefit of RLE exercise    Acceptance, E,D, VU,NR by GENOVEVA at 2/2/2019  3:48 PM    Acceptance, E,TB,D, DU,NR by MITA at 2/1/2019  4:54 AM    Acceptance, D, DU,NR by WHITNEY at 1/31/2019 10:51 AM    Acceptance, E, VU,NR by MA at 1/30/2019  3:43 PM   Family Acceptance, E,D, VU,DU by SILAS at 2/3/2019 10:48 AM    Comment:  Safety during ambulation, benefit of RLE exercise                   Point: Body mechanics (Done)     Learning Progress Summary           Patient Acceptance, E,D, VU,DU by SILAS at 2/3/2019 10:48 AM    Comment:  Safety during ambulation, benefit of RLE exercise    Acceptance, E,D, VU,NR by  at 2/2/2019  3:48 PM    Acceptance, E,TB, VU by KRISTEL at 2/1/2019 10:06 AM    Acceptance, E,TB,D, DU,NR by MITA at 2/1/2019  4:54 AM    Acceptance, D, DU,NR by WHITNEY at 1/31/2019 10:51 AM    Acceptance, E, VU,NR by MA at 1/30/2019  3:43 PM   Family Acceptance, E,D, VU,DU by SILAS at 2/3/2019 10:48 AM    Comment:  Safety during ambulation, benefit of RLE exercise                   Point: Precautions (Done)     Learning Progress Summary           Patient Acceptance, VARUN FINNEY VUDU by  at 2/3/2019 10:48 AM    Comment:  Safety during ambulation, benefit of RLE exercise    Acceptance, VARUN FINNEY,  VU,NR by  at 2/2/2019  3:48 PM    Acceptance, E,TB, VU by  at 2/1/2019 10:06 AM    Acceptance, E,TB,D, DU,NR by  at 2/1/2019  4:54 AM    Acceptance, D, DU,NR by  at 1/31/2019 10:51 AM    Acceptance, E, VU,NR by MA at 1/30/2019  3:43 PM   Family Acceptance, E,D, VU,DU by  at 2/3/2019 10:48 AM    Comment:  Safety during ambulation, benefit of RLE exercise                               User Key     Initials Effective Dates Name Provider Type Discipline     04/03/18 -  Giovana Shoemaker, PT Physical Therapist PT     04/06/17 -  Mary Sampson, RN Registered Nurse Nurse     03/07/18 -  Sylvie Harp, PTA Physical Therapy Assistant PT     06/22/16 -  Emma Carvalho, PT Physical Therapist PT     08/02/16 -  Camron Guerrero, PT DPT Physical Therapist PT    MA 10/19/18 -  Mohini Mcfadden, PT Physical Therapist PT                PT Recommendation and Plan     Plan of Care Reviewed With: patient, daughter  Progress: improving  Outcome Summary: Pt transferred supine to sit with supervision and use of bedrails. Pt performed sit to stand with RW and sBA x 1. Pt ambulated 250 ft with CGA x 1 and RW. Pt returned to sit EOB. No LOB during ambulation. Mild ataxic gait with RLE  Outcome Measures     Row Name 02/03/19 1000 02/02/19 1500 02/01/19 1500       How much help from another person do you currently need...    Turning from your back to your side while in flat bed without using bedrails?  4  -LC  3  -SM  --    Moving from lying on back to sitting on the side of a flat bed without bedrails?  4  -LC  3  -SM  --    Moving to and from a bed to a chair (including a wheelchair)?  3  -LC  3  -SM  --    Standing up from a chair using your arms (e.g., wheelchair, bedside chair)?  3  -LC  3  -SM  --    Climbing 3-5 steps with a railing?  2  -LC  2  -SM  --    To walk in hospital room?  3  -  2  -SM  --    AM-PAC 6 Clicks Score  19  -LC  16  -SM  --       How much help from another is currently needed...    Putting on and  taking off regular lower body clothing?  --  --  1  -LE    Bathing (including washing, rinsing, and drying)  --  --  2  -LE    Toileting (which includes using toilet bed pan or urinal)  --  --  1  -LE    Putting on and taking off regular upper body clothing  --  --  2  -LE    Taking care of personal grooming (such as brushing teeth)  --  --  2  -LE    Eating meals  --  --  2  -LE    Score  --  --  10  -LE       Functional Assessment    Outcome Measure Options  AM-PAC 6 Clicks Basic Mobility (PT)  -LC  AM-PAC 6 Clicks Basic Mobility (PT)  -SM  AM-PAC 6 Clicks Daily Activity (OT)  -LE    Row Name 02/01/19 1000 01/31/19 1400          How much help from another person do you currently need...    Turning from your back to your side while in flat bed without using bedrails?  3  -KH  --     Moving from lying on back to sitting on the side of a flat bed without bedrails?  3  -KH  --     Moving to and from a bed to a chair (including a wheelchair)?  3  -KH  --     Standing up from a chair using your arms (e.g., wheelchair, bedside chair)?  3  -KH  --     Climbing 3-5 steps with a railing?  1  -KH  --     To walk in hospital room?  2  -KH  --     AM-PAC 6 Clicks Score  15  -KH  --        How much help from another is currently needed...    Putting on and taking off regular lower body clothing?  --  1  -LE     Bathing (including washing, rinsing, and drying)  --  1  -LE     Toileting (which includes using toilet bed pan or urinal)  --  1  -LE     Putting on and taking off regular upper body clothing  --  1  -LE     Taking care of personal grooming (such as brushing teeth)  --  2  -LE     Eating meals  --  1  -LE     Score  --  7  -LE        Modified [place] Scale    Modified [place] Scale  --  5 - Severe disability.  Bedridden, incontinent, and requiring constant nursing care and attention.  -LE        Functional Assessment    Outcome Measure Options  AM-PAC 6 Clicks Basic Mobility (PT)  -KH  AM-PAC 6 Clicks Daily Activity (OT)   -LE       User Key  (r) = Recorded By, (t) = Taken By, (c) = Cosigned By    Initials Name Provider Type    Jasmin Romero, OTR Occupational Therapist    Sylvie Hernandez, PTA Physical Therapy Assistant    Emma Serrano, PT Physical Therapist    LC Camron Guerrero, PT DPT Physical Therapist         Time Calculation:   PT Charges     Row Name 02/03/19 1034             Time Calculation    Start Time  1020  -LC      Stop Time  1035  -LC      Time Calculation (min)  15 min  -LC      PT Received On  02/03/19  -LC      PT - Next Appointment  02/04/19  -LC         Time Calculation- PT    Total Timed Code Minutes- PT  15 minute(s)  -LC         Timed Charges    94689 - Gait Training Minutes   15  -LC        User Key  (r) = Recorded By, (t) = Taken By, (c) = Cosigned By    Initials Name Provider Type    Camron Manning, PT DPT Physical Therapist        Therapy Suggested Charges     Code   Minutes Charges    47449 (CPT®) Hc Pt Neuromusc Re Education Ea 15 Min      52884 (CPT®) Hc Pt Ther Proc Ea 15 Min      35370 (CPT®) Hc Gait Training Ea 15 Min 15 1    08044 (CPT®) Hc Pt Therapeutic Act Ea 15 Min      81939 (CPT®) Hc Pt Manual Therapy Ea 15 Min      20647 (CPT®) Hc Pt Iontophoresis Ea 15 Min      27998 (CPT®) Hc Pt Elec Stim Ea-Per 15 Min      82875 (CPT®) Hc Pt Ultrasound Ea 15 Min      16222 (CPT®) Hc Pt Self Care/Mgmt/Train Ea 15 Min      76547 (CPT®) Hc Pt Prosthetic (S) Train Initial Encounter, Each 15 Min      56358 (CPT®) Hc Pt Orthotic(S)/Prosthetic(S) Encounter, Each 15 Min      68281 (CPT®) Hc Orthotic(S) Mgmt/Train Initial Encounter, Each 15min      Total  15 1        Therapy Charges for Today     Code Description Service Date Service Provider Modifiers Qty    [id number] HC GAIT TRAINING EA 15 MIN 2/3/2019 Camron Guerrero, PT DPT GP 1          PT G-Codes  Outcome Measure Options: AM-PAC 6 Clicks Basic Mobility (PT)  AM-PAC 6 Clicks Score: 19  Score: 10  Modified [place] Scale: 5 - Severe disability.   Bedridden, incontinent, and requiring constant nursing care and attention.    Camron Guerrero, PT DPT  2/3/2019

## 2019-02-03 NOTE — PROGRESS NOTES
LOS: 6 days   Patient Care Team:  Provider, No Known as PCP - General    Subjective     Patient reports she is even better today she's a little stronger did better she says physical therapy thought she was a little better she's really excited that she is improving    Review of Systems:          Objective     Vital Signs  Vital Sign Min/Max for last 24 hours  Temp  Min: 97.2 °F (36.2 °C)  Max: 98.2 °F (36.8 °C)   BP  Min: 148/100  Max: 181/100   Pulse  Min: 69  Max: 84   Resp  Min: 18  Max: 18   SpO2  Min: 94 %  Max: 98 %   No Data Recorded   Weight  Min: 63.1 kg (139 lb 1.8 oz)  Max: 63.1 kg (139 lb 1.8 oz)        Ventilator/Non-Invasive Ventilation Settings (From admission, onward)    None                       Body mass index is 19.96 kg/m².  No intake/output data recorded.  No intake/output data recorded.        Physical Exam:  General Appearance: Well-developed elderly white female resting in bed she is a little hard of hearing she doesn't appear in any acute distress  Eyes: Conjunctiva are clear and anicteric pupils are equal  ENT: Mucous membranes are moist no erythema or exudates don't appreciate any facial asymmetry  Neck: Trachea Midline no palpable adenopathy or thyromegaly  Lungs: Clear nonlabored symmetric expansion  Cardiac: Regular rate and rhythm no murmur  Abdomen: Soft no palpable organomegaly or masses  : Not examined  Musculoskeletal: Grossly normal  Skin: No jaundice, no rashes, no petechiae  Neuro: She is alert oriented cooperative following commands well she has good  both right and left in pretty good dorsiflexion and plantarflexion the left is a little stronger than the right.  She is able to lift and hold her right leg up although she has a little bit of coordination difficulties sort of drifts around the same with her right arm she is able to hold but she has a little bit of conservative of revolving drift.  Not Present on the left.  Oh real change from  yesterday.  Extremities/P Vascular: No clubbing no cyanosis no edema palpable radial dorsalis pedis pulses bilaterally  MSE: Seems to be in very good spirits very pleasant engaged.       Labs:  Results from last 7 days   Lab Units 01/31/19  0353 01/29/19 2209 01/28/19 2033 01/28/19 2025   GLUCOSE mg/dL 101* 95  --  115*   SODIUM mmol/L 139 139  --  139   POTASSIUM mmol/L 3.6 3.5  --  3.1*   CO2 mmol/L 20.5* 19.8*  --  22.3   CHLORIDE mmol/L 105 106  --  102   ANION GAP mmol/L 13.5 13.2  --  14.7   CREATININE mg/dL 0.55* 0.62 0.90 0.93   BUN mg/dL 7* 8  --  13   BUN / CREAT RATIO  12.7 12.9  --  14.0   CALCIUM mg/dL 8.6 8.7  --  9.4   EGFR IF NONAFRICN AM mL/min/1.73 108 94  --  59*   ALK PHOS U/L  --   --   --  78   TOTAL PROTEIN g/dL  --   --   --  6.5   ALT (SGPT) U/L  --   --   --  14   AST (SGOT) U/L  --   --   --  19   BILIRUBIN mg/dL  --   --   --  0.6   ALBUMIN g/dL  --   --   --  4.20   GLOBULIN gm/dL  --   --   --  2.3     Estimated Creatinine Clearance: 61.5 mL/min (A) (by C-G formula based on SCr of 0.55 mg/dL (L)).      Results from last 7 days   Lab Units 01/28/19 2025   WBC 10*3/mm3 6.45   RBC 10*6/mm3 4.84   HEMOGLOBIN g/dL 16.1*   HEMATOCRIT % 45.4   MCV fL 93.8   MCH pg 33.3*   MCHC g/dL 35.5   RDW % 13.2*   RDW-SD fl 45.6   MPV fL 9.1   PLATELETS 10*3/mm3 244   NEUTROPHIL % % 55.4   LYMPHOCYTE % % 26.5   MONOCYTES % % 13.6*   EOSINOPHIL % % 3.7   BASOPHIL % % 0.8   IMM GRAN % % 0.2   NEUTROS ABS 10*3/mm3 3.57   LYMPHS ABS 10*3/mm3 1.71   MONOS ABS 10*3/mm3 0.88   EOS ABS 10*3/mm3 0.24   BASOS ABS 10*3/mm3 0.05   IMMATURE GRANS (ABS) 10*3/mm3 0.01         Results from last 7 days   Lab Units 01/28/19 2025   TROPONIN T ng/mL <0.010         Results from last 7 days   Lab Units 01/28/19 2025   TSH mIU/mL 1.120         Results from last 7 days   Lab Units 01/28/19 2025   INR  1.03     Microbiology Results (last 10 days)     ** No results found for the last 240 hours. **                amLODIPine  5 mg Oral Q24H   aspirin 81 mg Oral Daily   aspirin 300 mg Rectal Q24H   atorvastatin 40 mg Oral Nightly   clopidogrel 75 mg Oral Daily   levothyroxine 125 mcg Oral Daily   multivitamin 1 tablet Oral Daily   nicotine 1 patch Transdermal Q24H   [START ON 2/4/2019] vitamin B-12 1,000 mcg Oral Daily          Diagnostics:  Ct Angiogram Head With & Without Contrast    Result Date: 1/29/2019  EMERGENCY CONTRAST ENHANCED CT ANGIOGRAM OF THE HEAD AND NECK AND CT PERFUSION STUDY OF THE HEAD 01/28/2019  CLINICAL HISTORY: Acute onset slurred speech, right-sided weakness and facial droop.  Noncontrast head CT technique: Spiral CT images were obtained from the base of the skull to vertex without intravenous contrast and images were reformatted and submitted in 3 mm thick axial CT section with brain algorithm and 2 mm thick sagittal and coronal reconstructions were performed with brain algorithm.  FINDINGS: There are patchy areas of low density extending from the periventricular and subcortical white matter of the cerebral hemispheres consistent with mild/moderate small vessel disease. There is a 3 x 2 cm area of encephalomalacia compatible with an old infarct in posterior inferomedial left cerebellum and left PICA territory. The remainder of the brain parenchyma is normal in attenuation. The ventricles are normal in size. I see no midline shift. No extra-axial fluid collections are identified. There is densely calcified mass that is extra-axial and dural based overlying the inferior lateral left frontal lobe and left frontotemporal operculum measuring 2.6 x 1.8 x 2.2 cm in anterior posterior and medial lateral craniocaudal dimension most consistent with a densely calcified meningioma. The paranasal sinuses and the mastoid air cells and middle ear cavities are clear.      1. 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  2. There is densely calcified 2.5 x 1.8 x 2.2 cm meningioma overlying the inferior lateral left frontal lobe and left frontotemporal operculum. The remainder of the head CT is normal. The results were communicated to Dr. Falk from stroke neurology on 01/28/2019 at 8:33 PM and he requested a CT angiogram of the head and neck and CT perfusion study of the head.  CT angiogram head and neck and CT perfusion study technique: Spiral CT images were obtained through the head during arterial phase of contrast and images were reformatted and analyzed with rapid software analysis for CT perfusion study of the head and subsequently spiral CT images were obtained from the top of the aortic arch up through the great vessels of the head and neck during arterial phase of contrast and images were reformatted and submitted in 1 mm thick axial sagittal and coronal CT sections with soft tissue algorithm and additional 3D reconstructions were performed to complete a CT angiogram of the head and neck and finally spiral CT images were obtained from the base of the skull to the vertex delayed following intravenous contrast and these images were reformatted and submitted in 3 mm thick axial CT section with brain algorithm.  CT perfusion study: The CT perfusion images demonstrate no areas of reduced cerebral blood flow to less than 30% with no convincing areas of acute completed infarction in the field of view. Furthermore there are no areas of delayed time to maximal enhancement of greater than 6 seconds with no convincing focal hypoperfused areas seen in the brain.  CT angiogram of the neck: The nasopharynx, oropharynx, hypopharynx, true cords and subglottic airway are normal in appearance. The lung apices are clear. The parotid  parapharyngeal and submandibular spaces are symmetric and normal in appearance. Cervical spondylosis is present, disc space narrowing and degenerative disc and endplate changes C3-C7, some posterior  endplate spurring and uncovertebral joint hypertrophy with mild canal and mild/moderate right foraminal narrowing C3-4, mild canal and mild right foraminal narrowing C4-5, mild canal and foraminal narrowing at C5-6 and C6-7. There is abnormal enlargement of the right neural foramen at C2-3 with mass extending from the right lateral epidural space through the right neural foramen at C2-3 that measures approximately 14 x 14 x 16 mm in size likely a neurofibroma. There is limited evaluation of the great vessel origins off the aortic arch due to beam hardening artifact off densely opacified left brachiocephalic vein. There appears to be a moderate stenosis of the origin and proximal left subclavian artery due to noncalcified atherosclerotic plaque and the distal left subclavian artery is normal in appearance. The left vertebral artery is suboptimally visualized and appears to be likely occluded from its origin to just anterior to the C3-4 interspace level where there is an ascending cervical collateral that aids in reconstituting a tiny diameter diseased left vertebral artery from the C3 cervical level to just above the C1 ring where there is another collateral that hooks into the back wall the upper cervical left vertebral artery and then the left vertebral arteries is patent to the vertebrobasilar junction although there may be an additional moderate stenosis of the distal intracranial segment of the left vertebral artery just proximal to the vertebrobasilar junction. No stenosis seen in the left common carotid artery, its bifurcation and left internal and external carotid arteries within normal limits and no stenosis is seen in the left internal carotid arteries using the NASCET criteria. The brachycephalic artery origin is normal in appearance. No stenosis seen in the brachycephalic artery, its bifurcation into the right common carotid artery and subclavian arteries are normal in appearance. No stenosis in the right  clavian artery origin. The right vertebral artery origin is normal in appearance. The right vertebral artery is the dominant vertebral artery and is widely patent from its origin and the vertebral basilar junction without stenosis. The right common carotid origin is normal in appearance. No stenosis seen in the right common carotid artery, its bifurcation into the right internal and external carotid arteries is within normal limits. No stenosis seen in the right internal carotid artery using the NASCET criteria.  CT angiogram of the head: CT angiogram images through the head demonstrate tiny diameter intracranial segment of the left vertebral artery that appears to be patent and gives off the left posterior inferior cerebellar artery and post PICA segments very tiny in diameter but patent to the vertebrobasilar junction dominant right vertebral artery is widely patent to the vertebrobasilar junction, the basilar artery and basilar tips within normal limits. The posterior cerebral, superior cerebellar arteries are within normal limits. The upper cervical petrous cavernous and supracavernous segments of the internal carotid arteries are within normal limits. The anterior cerebral arteries are within normal limits. There is some nodular prominence at the level of the anterior communicating artery origin measuring 2.6 mm in size probably a junctional infundibulum rather than aneurysm. The M1 segments and middle cerebral arteries and middle cerebral artery trifurcations are within normal limits.  IMPRESSION: 1. 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 There is also a 2.5 x 1.8 x 2.2 cm densely calcified meningioma overlying the inferior lateral left frontal lobe and extending lateral to the left anterior frontotemporal operculum. 2. CT perfusion study demonstrates no  convincing hypoperfused areas or acute completed infarction in the field of view in the brain. If there remains clinical suspicion of an acute infarct, I recommend an MRI of the brain to further evaluate. 3. 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 4. The left vertebral artery is suboptimally assessed and may be occluded from its origin to the C3 cervical level where there is an ascending cervical collateral that reconstitutes a diseased small caliber left vertebral artery from the C3 cervical level to just above the C1 ring where there is another collateral that hooks into the back wall of the left vertebral artery just above the C1 ring and within the small diameter left vertebral artery is widely patent from C1 ring to its distal intracranial segment where there appears to be an additional moderate stenosis just proximal to the vertebrobasilar junction. There appears to be focal dilatation of the collateral branch just proximal to its reconstitution of the left vertebral artery at the level of inferior endplate of C3 where it goes from 1 to 2.5 mm in diameter best seen on axial CT images 130 through 132. This finding can be further assessed with an MRA of the neck. No additional stenosis is seen in the great vessels at the head or neck. Specifically, no stenosis is seen in the internal carotid arteries using the NASCET criteria. The final dictated report was communicated to Dr. Falk from stroke neurology by telephone on 01/28/2019 at 8:55 PM and also to Dr. Pineda in the emergency room 01/28/2019 at 9:30 PM.   Radiation dose reduction techniques were utilized, including automated exposure control and exposure modulation based on body size.  This report was finalized on 1/29/2019 10:16 AM by Dr. Ludin Stout M.D.      Ct Head Without Contrast    Result Date: 1/29/2019  CT OF THE HEAD WITHOUT CONTRAST  HISTORY: Stroke  COMPARISON: July 28, 2019  TECHNIQUE: Axial CT imaging was obtained from the vertex of the skull through the skull base. No IV contrast was administered.  FINDINGS: No IV contrast was administered for this examination, although the patient did receive contrast for prior stroke protocol. No obvious acute intracranial hemorrhage is identified, but the exam is degraded by the presence of intravenous contrast material. The ventricles are normal in size. Patient is noted to have a large calcified left frontal meningioma. This is not associated with any midline shift or mass effect. There is some mild periventricular deep white matter microangiopathic change. There is an old left cerebellar infarct, as well as an area of decreased attenuation seen within the left basal ganglia, which is also favored to be chronic. No new areas of decreased attenuation are identified. There is no evidence of venous occlusion. Mucosal thickening is seen within the ethmoid sinuses. Mastoid air cells appear clear. No focal soft tissue abnormalities are seen.      No acute intracranial process identified. If symptoms persist, correlation with MRI suggested.  Radiation dose reduction techniques were utilized, including automated exposure control and exposure modulation based on body size.  This report was finalized on 1/29/2019 1:27 AM by Dr. Kadi Gomez M.D.      Ct Angiogram Neck With & Without Contrast    Result Date: 1/29/2019  EMERGENCY CONTRAST ENHANCED CT ANGIOGRAM OF THE HEAD AND NECK AND CT PERFUSION STUDY OF THE HEAD 01/28/2019  CLINICAL HISTORY: Acute onset slurred speech, right-sided weakness and facial droop.   Noncontrast head CT technique: Spiral CT images were obtained from the base of the skull to vertex without intravenous contrast and images were reformatted and submitted in 3 mm thick axial CT section with brain algorithm and 2 mm thick sagittal and coronal reconstructions were performed with brain algorithm.  FINDINGS: There are patchy areas of low density extending from the periventricular and subcortical white matter of the cerebral hemispheres consistent with mild/moderate small vessel disease. There is a 3 x 2 cm area of encephalomalacia compatible with an old infarct in posterior inferomedial left cerebellum and left PICA territory. The remainder of the brain parenchyma is normal in attenuation. The ventricles are normal in size. I see no midline shift. No extra-axial fluid collections are identified. There is densely calcified mass that is extra-axial and dural based overlying the inferior lateral left frontal lobe and left frontotemporal operculum measuring 2.6 x 1.8 x 2.2 cm in anterior posterior and medial lateral craniocaudal dimension most consistent with a densely calcified meningioma. The paranasal sinuses and the mastoid air cells and middle ear cavities are clear.      1. 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 2. There is densely calcified 2.5 x 1.8 x 2.2 cm meningioma overlying the inferior lateral left frontal lobe and left frontotemporal operculum. The remainder of the head CT is normal. The results were communicated to Dr. Falk from stroke neurology on 01/28/2019 at 8:33 PM and he requested a CT angiogram of the head and neck and CT perfusion study of the head.  CT angiogram head and neck and CT perfusion study technique: Spiral CT images were obtained through the head during arterial phase of contrast and images were reformatted and analyzed with rapid software analysis for CT  perfusion study of the head and subsequently spiral CT images were obtained from the top of the aortic arch up through the great vessels of the head and neck during arterial phase of contrast and images were reformatted and submitted in 1 mm thick axial sagittal and coronal CT sections with soft tissue algorithm and additional 3D reconstructions were performed to complete a CT angiogram of the head and neck and finally spiral CT images were obtained from the base of the skull to the vertex delayed following intravenous contrast and these images were reformatted and submitted in 3 mm thick axial CT section with brain algorithm.  CT perfusion study: The CT perfusion images demonstrate no areas of reduced cerebral blood flow to less than 30% with no convincing areas of acute completed infarction in the field of view. Furthermore there are no areas of delayed time to maximal enhancement of greater than 6 seconds with no convincing focal hypoperfused areas seen in the brain.  CT angiogram of the neck: The nasopharynx, oropharynx, hypopharynx, true cords and subglottic airway are normal in appearance. The lung apices are clear. The parotid  parapharyngeal and submandibular spaces are symmetric and normal in appearance. Cervical spondylosis is present, disc space narrowing and degenerative disc and endplate changes C3-C7, some posterior endplate spurring and uncovertebral joint hypertrophy with mild canal and mild/moderate right foraminal narrowing C3-4, mild canal and mild right foraminal narrowing C4-5, mild canal and foraminal narrowing at C5-6 and C6-7. There is abnormal enlargement of the right neural foramen at C2-3 with mass extending from the right lateral epidural space through the right neural foramen at C2-3 that measures approximately 14 x 14 x 16 mm in size likely a neurofibroma. There is limited evaluation of the great vessel origins off the aortic arch due to beam hardening artifact off densely  opacified left brachiocephalic vein. There appears to be a moderate stenosis of the origin and proximal left subclavian artery due to noncalcified atherosclerotic plaque and the distal left subclavian artery is normal in appearance. The left vertebral artery is suboptimally visualized and appears to be likely occluded from its origin to just anterior to the C3-4 interspace level where there is an ascending cervical collateral that aids in reconstituting a tiny diameter diseased left vertebral artery from the C3 cervical level to just above the C1 ring where there is another collateral that hooks into the back wall the upper cervical left vertebral artery and then the left vertebral arteries is patent to the vertebrobasilar junction although there may be an additional moderate stenosis of the distal intracranial segment of the left vertebral artery just proximal to the vertebrobasilar junction. No stenosis seen in the left common carotid artery, its bifurcation and left internal and external carotid arteries within normal limits and no stenosis is seen in the left internal carotid arteries using the NASCET criteria. The brachycephalic artery origin is normal in appearance. No stenosis seen in the brachycephalic artery, its bifurcation into the right common carotid artery and subclavian arteries are normal in appearance. No stenosis in the right clavian artery origin. The right vertebral artery origin is normal in appearance. The right vertebral artery is the dominant vertebral artery and is widely patent from its origin and the vertebral basilar junction without stenosis. The right common carotid origin is normal in appearance. No stenosis seen in the right common carotid artery, its bifurcation into the right internal and external carotid arteries is within normal limits. No stenosis seen in the right internal carotid artery using the NASCET criteria.  CT angiogram of the head: CT angiogram images through the head  demonstrate tiny diameter intracranial segment of the left vertebral artery that appears to be patent and gives off the left posterior inferior cerebellar artery and post PICA segments very tiny in diameter but patent to the vertebrobasilar junction dominant right vertebral artery is widely patent to the vertebrobasilar junction, the basilar artery and basilar tips within normal limits. The posterior cerebral, superior cerebellar arteries are within normal limits. The upper cervical petrous cavernous and supracavernous segments of the internal carotid arteries are within normal limits. The anterior cerebral arteries are within normal limits. There is some nodular prominence at the level of the anterior communicating artery origin measuring 2.6 mm in size probably a junctional infundibulum rather than aneurysm. The M1 segments and middle cerebral arteries and middle cerebral artery trifurcations are within normal limits.  IMPRESSION: 1. 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 There is also a 2.5 x 1.8 x 2.2 cm densely calcified meningioma overlying the inferior lateral left frontal lobe and extending lateral to the left anterior frontotemporal operculum. 2. CT perfusion study demonstrates no convincing hypoperfused areas or acute completed infarction in the field of view in the brain. If there remains clinical suspicion of an acute infarct, I recommend an MRI of the brain to further evaluate. 3. 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 4. The left vertebral artery is suboptimally assessed and may be occluded from its origin to the C3 cervical level where there is an ascending cervical collateral that reconstitutes a diseased small caliber left vertebral artery from the C3 cervical level to just above the C1 ring where there is another collateral that hooks into the back wall of the left vertebral artery just above the C1 ring and within the small diameter left vertebral artery is widely patent from C1 ring to its distal intracranial segment where there appears to be an additional moderate stenosis just proximal to the vertebrobasilar junction. There appears to be focal dilatation of the collateral branch just proximal to its reconstitution of the left vertebral artery at the level of inferior endplate of C3 where it goes from 1 to 2.5 mm in diameter best seen on axial CT images 130 through 132. This finding can be further assessed with an MRA of the neck. No additional stenosis is seen in the great vessels at the head or neck. Specifically, no stenosis is seen in the internal carotid arteries using the NASCET criteria. The final dictated report was communicated to Dr. Falk from stroke neurology by telephone on 01/28/2019 at 8:55 PM and also to Dr. Pineda in the emergency room 01/28/2019 at 9:30 PM.  Radiation dose reduction techniques were utilized, including automated exposure control and exposure modulation based on body size.  This report was finalized on 1/29/2019 10:16 AM by Dr. Ludin Stout M.D.      Mri Brain Without Contrast    Addendum Date: 1/29/2019    Findings were relayed to the patient's nurse at 10:36 PM on January 29, 2019.  This report was finalized on 1/29/2019 10:37 PM by Dr. Kadi Gomez M.D.      Result Date: 1/29/2019  BRAIN MRI WITHOUT CONTRAST  HISTORY: Stroke  COMPARISON: None.  FINDINGS:  Multiplanar images of the head were obtained without  gadolinium. Study is positive for an area of restricted diffusion within the posterior limb of the left internal capsule and left corona radiata. Maximum dimensions are 1.4 x 1.0 cm. No additional areas of restricted diffusion are seen. The patient does have an area of encephalomalacia within the left cerebellar hemisphere. There is no evidence of hemorrhagic transformation on gradient echo images. Patient's peripherally calcified left frontal meningioma is again noted. It measures up to 2.4 x 1.7 cm. There is diffuse cerebral atrophy, in keeping with the age of 74. There is periventricular and deep white matter microangiopathic change. There is no midline shift or mass effect. Mild mucosal thickening is seen within the ethmoid sinuses. Old lacunar infarct is seen within the left basal ganglia. Intracranial flow-voids appear intact      1. Study is positive for area of acute infarct within the posterior limb of the left internal capsule and left corona radiata. There is no associated midline shift or mass effect. No additional areas of restricted diffusion are identified.  This report was finalized on 1/29/2019 9:21 PM by Dr. Kadi Gomez M.D.      Xr Chest 1 View    Result Date: 1/28/2019  PORTABLE CHEST RADIOGRAPH  HISTORY: Acute stroke protocol  COMPARISON: None available.  FINDINGS: Heart size is within normal limits for portable technique. There is tortuosity of the thoracic aorta. No pneumothorax or pleural effusion is seen. Patient is noted to have bibasilar atelectasis.      Bibasilar atelectasis.  This report was finalized on 1/28/2019 9:42 PM by Dr. Kadi Gomez M.D.      Xr Spine Lumbar 1 View    Result Date: 1/29/2019  XR SPINE LUMBAR 1 VW-  INDICATIONS:    Back pain  TECHNIQUE: A FRONTAL VIEW OF THE LUMBAR SPINE  COMPARISON: None available  FINDINGS:  The image is limited by overpenetration. S-shaped scoliosis of the thoracolumbar spine is seen. No obvious displaced fracture is noted on this  single view. Multilevel endplate spurring is apparent. There appears to be disc space narrowing at L1-L3, although suboptimally characterized with this technique. Arterial calcifications are seen. Further evaluation can be obtained as indicated.       As described.  This report was finalized on 1/29/2019 2:37 PM by Dr. Jj Richardson M.D.      Fl Video Swallow    Result Date: 1/31/2019  VIDEO SWALLOW STUDY  HISTORY: Dysphagia.  3 minutes 20 seconds fluoroscopy was provided for the speech pathologist during a video swallow study. 4,069 images were saved.  Laryngeal penetration and aspiration were observed during the exam.      Fluoroscopy was provided for the speech pathologist during a video swallow study. For full details please see the speech pathology report.  This report was finalized on 1/31/2019 6:28 PM by Dr. Raf Uriarte M.D.      Ct Cerebral Perfusion With & Without Contrast    Result Date: 1/29/2019  EMERGENCY CONTRAST ENHANCED CT ANGIOGRAM OF THE HEAD AND NECK AND CT PERFUSION STUDY OF THE HEAD 01/28/2019  CLINICAL HISTORY: Acute onset slurred speech, right-sided weakness and facial droop.  Noncontrast head CT technique: Spiral CT images were obtained from the base of the skull to vertex without intravenous contrast and images were reformatted and submitted in 3 mm thick axial CT section with brain algorithm and 2 mm thick sagittal and coronal reconstructions were performed with brain algorithm.  FINDINGS: There are patchy areas of low density extending from the periventricular and subcortical white matter of the cerebral hemispheres consistent with mild/moderate small vessel disease. There is a 3 x 2 cm area of encephalomalacia compatible with an old infarct in posterior inferomedial left cerebellum and left PICA territory. The remainder of the brain parenchyma is normal in attenuation. The ventricles are normal in size. I see no midline shift. No extra-axial fluid collections are identified.  There is densely calcified mass that is extra-axial and dural based overlying the inferior lateral left frontal lobe and left frontotemporal operculum measuring 2.6 x 1.8 x 2.2 cm in anterior posterior and medial lateral craniocaudal dimension most consistent with a densely calcified meningioma. The paranasal sinuses and the mastoid air cells and middle ear cavities are clear.      1. 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 2. There is densely calcified 2.5 x 1.8 x 2.2 cm meningioma overlying the inferior lateral left frontal lobe and left frontotemporal operculum. The remainder of the head CT is normal. The results were communicated to Dr. Falk from stroke neurology on 01/28/2019 at 8:33 PM and he requested a CT angiogram of the head and neck and CT perfusion study of the head.  CT angiogram head and neck and CT perfusion study technique: Spiral CT images were obtained through the head during arterial phase of contrast and images were reformatted and analyzed with rapid software analysis for CT perfusion study of the head and subsequently spiral CT images were obtained from the top of the aortic arch up through the great vessels of the head and neck during arterial phase of contrast and images were reformatted and submitted in 1 mm thick axial sagittal and coronal CT sections with soft tissue algorithm and additional 3D reconstructions were performed to complete a CT angiogram of the head and neck and finally spiral CT images were obtained from the base of the skull to the vertex delayed following intravenous contrast and these images were reformatted and submitted in 3 mm thick axial CT section with brain algorithm.  CT perfusion study: The CT perfusion images demonstrate no areas of reduced cerebral blood flow to less than 30% with no convincing areas of acute completed infarction in the field of  view. Furthermore there are no areas of delayed time to maximal enhancement of greater than 6 seconds with no convincing focal hypoperfused areas seen in the brain.  CT angiogram of the neck: The nasopharynx, oropharynx, hypopharynx, true cords and subglottic airway are normal in appearance. The lung apices are clear. The parotid  parapharyngeal and submandibular spaces are symmetric and normal in appearance. Cervical spondylosis is present, disc space narrowing and degenerative disc and endplate changes C3-C7, some posterior endplate spurring and uncovertebral joint hypertrophy with mild canal and mild/moderate right foraminal narrowing C3-4, mild canal and mild right foraminal narrowing C4-5, mild canal and foraminal narrowing at C5-6 and C6-7. There is abnormal enlargement of the right neural foramen at C2-3 with mass extending from the right lateral epidural space through the right neural foramen at C2-3 that measures approximately 14 x 14 x 16 mm in size likely a neurofibroma. There is limited evaluation of the great vessel origins off the aortic arch due to beam hardening artifact off densely opacified left brachiocephalic vein. There appears to be a moderate stenosis of the origin and proximal left subclavian artery due to noncalcified atherosclerotic plaque and the distal left subclavian artery is normal in appearance. The left vertebral artery is suboptimally visualized and appears to be likely occluded from its origin to just anterior to the C3-4 interspace level where there is an ascending cervical collateral that aids in reconstituting a tiny diameter diseased left vertebral artery from the C3 cervical level to just above the C1 ring where there is another collateral that hooks into the back wall the upper cervical left vertebral artery and then the left vertebral arteries is patent to the vertebrobasilar junction although there may be an additional moderate stenosis of the distal intracranial  segment of the left vertebral artery just proximal to the vertebrobasilar junction. No stenosis seen in the left common carotid artery, its bifurcation and left internal and external carotid arteries within normal limits and no stenosis is seen in the left internal carotid arteries using the NASCET criteria. The brachycephalic artery origin is normal in appearance. No stenosis seen in the brachycephalic artery, its bifurcation into the right common carotid artery and subclavian arteries are normal in appearance. No stenosis in the right clavian artery origin. The right vertebral artery origin is normal in appearance. The right vertebral artery is the dominant vertebral artery and is widely patent from its origin and the vertebral basilar junction without stenosis. The right common carotid origin is normal in appearance. No stenosis seen in the right common carotid artery, its bifurcation into the right internal and external carotid arteries is within normal limits. No stenosis seen in the right internal carotid artery using the NASCET criteria.  CT angiogram of the head: CT angiogram images through the head demonstrate tiny diameter intracranial segment of the left vertebral artery that appears to be patent and gives off the left posterior inferior cerebellar artery and post PICA segments very tiny in diameter but patent to the vertebrobasilar junction dominant right vertebral artery is widely patent to the vertebrobasilar junction, the basilar artery and basilar tips within normal limits. The posterior cerebral, superior cerebellar arteries are within normal limits. The upper cervical petrous cavernous and supracavernous segments of the internal carotid arteries are within normal limits. The anterior cerebral arteries are within normal limits. There is some nodular prominence at the level of the anterior communicating artery origin measuring 2.6 mm in size probably a junctional infundibulum rather than aneurysm.  The M1 segments and middle cerebral arteries and middle cerebral artery trifurcations are within normal limits.  IMPRESSION: 1. 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 There is also a 2.5 x 1.8 x 2.2 cm densely calcified meningioma overlying the inferior lateral left frontal lobe and extending lateral to the left anterior frontotemporal operculum. 2. CT perfusion study demonstrates no convincing hypoperfused areas or acute completed infarction in the field of view in the brain. If there remains clinical suspicion of an acute infarct, I recommend an MRI of the brain to further evaluate. 3. 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 4. The left vertebral artery is suboptimally assessed and may be occluded from its origin to the C3 cervical level where there is an ascending cervical collateral that reconstitutes a diseased small caliber left vertebral artery from the C3 cervical level to just above the C1 ring where there is another collateral that hooks into the back wall of the left vertebral artery just above the C1 ring and within the small diameter left vertebral artery is widely patent from C1 ring to its distal intracranial segment where there appears to be an additional moderate stenosis just proximal to the vertebrobasilar junction. There appears to be focal dilatation of the collateral branch just proximal to its reconstitution of the left vertebral artery at  the level of inferior endplate of C3 where it goes from 1 to 2.5 mm in diameter best seen on axial CT images 130 through 132. This finding can be further assessed with an MRA of the neck. No additional stenosis is seen in the great vessels at the head or neck. Specifically, no stenosis is seen in the internal carotid arteries using the NASCET criteria. The final dictated report was communicated to Dr. Falk from stroke neurology by telephone on 01/28/2019 at 8:55 PM and also to Dr. Pineda in the emergency room 01/28/2019 at 9:30 PM.  Radiation dose reduction techniques were utilized, including automated exposure control and exposure modulation based on body size.  This report was finalized on 1/29/2019 10:16 AM by Dr. Ludin Stout M.D.      Results for orders placed during the hospital encounter of 01/28/19   Adult Transthoracic Echo Complete W/ Cont if Necessary Per Protocol    Narrative · Left ventricular systolic function is normal. Calculated EF = 65%.   Estimated EF was in agreement with the calculated EF. Estimated EF = 65%.   Normal left ventricular cavity size noted. All left ventricular wall   segments contract normally. Left ventricular wall thickness is consistent   with mild concentric hypertrophy. Left ventricular diastolic dysfunction   is noted (grade I) consistent with impaired relaxation.  · Normal left atrial volume noted. Saline test results are negative.  · Severe MAC is present. Mild mitral valve regurgitation is present.  · Trace tricuspid valve regurgitation is present. Estimated right   ventricular systolic pressure from tricuspid regurgitation is normal (<35   mmHg). Calculated right ventricular systolic pressure from tricuspid   regurgitation is 20 mmHg.              Active Hospital Problems    Diagnosis Date Noted   • Acute CVA (cerebrovascular accident) (CMS/[facility]) [I63.9] 01/28/2019      Resolved Hospital Problems   No resolved problems to display.         Assessment/Plan     1. Left  internal capsule ischemic CVA status post TPA workup completed except she'll need outpatient rhythm monitoring( zio patch) and neurology recommends dual antiplatelet therapy aspirin and Plavix for 3 months and then single agent Lipitor 40 mg daily  2. Right hemiparesis secondary to #1  3. Oral pharyngeal dysphagia secondary to #1 on dysphagia diet  4.  hypertension blood pressure was remaining a little high we'll have to increase medications and gradually bring this down  5. Hypothyroidism on replacement  6. Incidental meningioma  7. 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  tobacco abuse needs to stop smoking.  I have discussed with her    Plan for disposition: Patient is medically ready awaiting discharge planning    Ady Gomez MD  02/03/19  6:21 PM    Time:

## 2019-02-04 PROCEDURE — 97110 THERAPEUTIC EXERCISES: CPT

## 2019-02-04 RX ORDER — SIMETHICONE 80 MG
80 TABLET,CHEWABLE ORAL 4 TIMES DAILY PRN
Status: DISCONTINUED | OUTPATIENT
Start: 2019-02-04 | End: 2019-02-06 | Stop reason: HOSPADM

## 2019-02-04 RX ADMIN — LEVOTHYROXINE SODIUM 125 MCG: 125 TABLET ORAL at 08:28

## 2019-02-04 RX ADMIN — ATORVASTATIN CALCIUM 40 MG: 20 TABLET, FILM COATED ORAL at 20:04

## 2019-02-04 RX ADMIN — Medication 1 TABLET: at 08:28

## 2019-02-04 RX ADMIN — ASPIRIN 81 MG: 81 TABLET, CHEWABLE ORAL at 08:28

## 2019-02-04 RX ADMIN — ACETAMINOPHEN 500 MG: 500 TABLET, FILM COATED ORAL at 22:24

## 2019-02-04 RX ADMIN — ACETAMINOPHEN 500 MG: 500 TABLET, FILM COATED ORAL at 14:32

## 2019-02-04 RX ADMIN — Medication 1000 MCG: at 08:28

## 2019-02-04 RX ADMIN — AMLODIPINE BESYLATE 5 MG: 5 TABLET ORAL at 08:28

## 2019-02-04 RX ADMIN — NICOTINE 1 PATCH: 21 PATCH, EXTENDED RELEASE TRANSDERMAL at 08:28

## 2019-02-04 RX ADMIN — CLOPIDOGREL 75 MG: 75 TABLET, FILM COATED ORAL at 08:28

## 2019-02-04 NOTE — THERAPY TREATMENT NOTE
Acute Care - Physical Therapy Treatment Note  [facility]     Patient Name: Sommer Smith  : 1945  MRN: [id number]  Today's Date: 2019        Referring Physician: Blossom    Admit Date: 2019    Visit Dx:    ICD-10-CM ICD-9-CM   1. Acute CVA (cerebrovascular accident) (CMS/[facility]) I63.9 434.91   2. Decreased mobility R26.89 781.99     Patient Active Problem List   Diagnosis   • Acute CVA (cerebrovascular accident) (CMS/[facility])       Therapy Treatment    Rehabilitation Treatment Summary     Row Name 19 1040             Treatment Time/Intention    Discipline  physical therapy assistant  -      Document Type  therapy note (daily note)  -SM      Subjective Information  no complaints  -SM      Mode of Treatment  physical therapy  -      Patient Effort  excellent  -      Existing Precautions/Restrictions  fall  -SM      Recorded by [SM] Sylvie Harp PTA 19 1122      Row Name 19 1040             Cognitive Assessment/Intervention- PT/OT    Orientation Status (Cognition)  oriented x 4  -SM      Follows Commands (Cognition)  WNL  -      Personal Safety Interventions  fall prevention program maintained;gait belt;nonskid shoes/slippers when out of bed  -      Recorded by [SM] Sylvie Harp PTA 19 1122      Row Name 19 1040             Bed Mobility Assessment/Treatment    Comment (Bed Mobility)  up in chair  -      Recorded by [SM] Sylvie Harp PTA 19 1122      Row Name 19 1040             Transfer Assessment/Treatment    Transfer Assessment/Treatment  sit-stand transfer;stand-sit transfer  -      Recorded by [] Sylvie Harp PTA 19 1122      Row Name 19 1040             Sit-Stand Transfer    Sit-Stand [place] (Transfers)  stand by assist  -      Assistive Device (Sit-Stand Transfers)  walker, front-wheeled  -      Recorded by [] Sylvie Harp PTA 19 112      Row Name 19 1040              Stand-Sit Transfer    Stand-Sit [place] (Transfers)  stand by assist  -      Assistive Device (Stand-Sit Transfers)  walker, front-wheeled  -SM      Recorded by [] [facility] 02/04/19 1122      Row Name 02/04/19 1040             Gait/Stairs Assessment/Training    [place] Level (Gait)  contact guard  -      Assistive Device (Gait)  walker, front-wheeled  -      Distance in Feet (Gait)  300  -SM      Pattern (Gait)  step-through  -SM      Deviations/Abnormal Patterns (Gait)  right sided deviations;shruthi decreased;stride length decreased  -SM      Right Sided Gait Deviations  heel strike decreased  -SM      Recorded by [] Sylvie Harp PTA 02/04/19 1122      Row Name 02/04/19 1040             Motor Skills Assessment/Interventions    Additional Documentation  Balance (Group)  -SM      Recorded by [] Sylive Harp PTA 02/04/19 1122      Row Name 02/04/19 1040             Balance    Balance  standing balance activity;dynamic balance activity  -SM      Recorded by [] Sylvie Harp PTA 02/04/19 1122      Row Name 02/04/19 1040             Standing Balance Activity    Activities Performed (Standing, Balance Training)  tandem stance;unilateral stance  -SM      Support Needed for Balance (Standing, Balance Training)  uses at least one upper extremity for support;uses one upper extremity part time for support;balances without upper extremity support;CGA;minimal external support for balance, 75% patient effort  -SM      Comment (Standing, Balance Training)  maintained for up to 30secs each stance x2; beginning w/ 2 hand support, then 1 hand, then no UE support  -SM      Recorded by [] Sylvie Harp PTA 02/04/19 1122      Row Name 02/04/19 1040             Dynamic Balance Activity    Therapeutic Training Performed (Dynamic Balance)  backward walking  -SM      Support Needed for Balance (Dynamic Balance Training)  uses at least one upper extremity for  support;CGA;minimal external support for balance, 75% patient effort  -SM      Recorded by [] Sylvie Harp, PTA 02/04/19 1122      Row Name 02/04/19 1040             Positioning and Restraints    Pre-Treatment Position  sitting in chair/recliner  -SM      Post Treatment Position  chair  -SM      In Chair  sitting;call light within reach;encouraged to call for assist  -SM      Recorded by [] Sylvie Harp, PTA 02/04/19 1122      Row Name 02/04/19 1040             Pain Scale: Numbers Pre/Post-Treatment    Pain Scale: Numbers, Pretreatment  0/10 - no pain  -SM      Pain Scale: Numbers, Post-Treatment  0/10 - no pain  -SM      Recorded by [] Sylvie Harp, [facility] 02/04/19 1122        User Key  (r) = Recorded By, (t) = Taken By, (c) = Cosigned By    Initials Name Effective Dates Discipline     Sylvie Harp, [facility] 03/07/18 -  PT                   Physical Therapy Education     Title: PT OT SLP Therapies (Done)     Topic: Physical Therapy (Done)     Point: Mobility training (Done)     Learning Progress Summary           Patient Acceptance, E,TB,D, VU,NR by  at 2/4/2019 11:23 AM    Acceptance, E,D, VU,DU by SILAS at 2/3/2019 10:48 AM    Comment:  Safety during ambulation, benefit of RLE exercise    Acceptance, E,D, VU,NR by  at 2/2/2019  3:48 PM    Acceptance, E,TB, VU by KRISTEL at 2/1/2019 10:06 AM    Acceptance, E,TB,D, DU,NR by MITA at 2/1/2019  4:54 AM    Acceptance, D, DU,NR by PC at 1/31/2019 10:51 AM    Acceptance, E, VU,NR by MA at 1/30/2019  3:43 PM   Family Acceptance, E,D, VU,DU by SILAS at 2/3/2019 10:48 AM    Comment:  Safety during ambulation, benefit of RLE exercise                   Point: Home exercise program (Done)     Learning Progress Summary           Patient Acceptance, E,TB,D, VU,NR by  at 2/4/2019 11:23 AM    Acceptance, E,D, VU,DU by SILAS at 2/3/2019 10:48 AM    Comment:  Safety during ambulation, benefit of RLE exercise    Acceptance, E,D, VU,NR by SM at 2/2/2019  3:48 PM     Acceptance, E,TB,D, DU,NR by MITA at 2/1/2019  4:54 AM    Acceptance, D, DU,NR by WHITNEY at 1/31/2019 10:51 AM    Acceptance, E, VU,NR by MA at 1/30/2019  3:43 PM   Family Acceptance, E,D, VU,DU by SILAS at 2/3/2019 10:48 AM    Comment:  Safety during ambulation, benefit of RLE exercise                   Point: Body mechanics (Done)     Learning Progress Summary           Patient Acceptance, E,TB,D, VU,NR by  at 2/4/2019 11:23 AM    Acceptance, E,D, VU,DU by SILAS at 2/3/2019 10:48 AM    Comment:  Safety during ambulation, benefit of RLE exercise    Acceptance, E,D, VU,NR by  at 2/2/2019  3:48 PM    Acceptance, E,TB, VU by KRISTEL at 2/1/2019 10:06 AM    Acceptance, E,TB,D, DU,NR by MITA at 2/1/2019  4:54 AM    Acceptance, D, DU,NR by WHITNEY at 1/31/2019 10:51 AM    Acceptance, E, VU,NR by MA at 1/30/2019  3:43 PM   Family Acceptance, E,D, VU,DU by SILAS at 2/3/2019 10:48 AM    Comment:  Safety during ambulation, benefit of RLE exercise                   Point: Precautions (Done)     Learning Progress Summary           Patient Acceptance, E,TB,D, VU,NR by  at 2/4/2019 11:23 AM    Acceptance, E,D, VU,DU by SILAS at 2/3/2019 10:48 AM    Comment:  Safety during ambulation, benefit of RLE exercise    Acceptance, E,D, VU,NR by  at 2/2/2019  3:48 PM    Acceptance, E,TB, VU by KRISTEL at 2/1/2019 10:06 AM    Acceptance, E,TB,D, DU,NR by MITA at 2/1/2019  4:54 AM    Acceptance, D, DU,NR by WHITNEY at 1/31/2019 10:51 AM    Acceptance, E, VU,NR by MA at 1/30/2019  3:43 PM   Family Acceptance, E,D, VU,DU by SILAS at 2/3/2019 10:48 AM    Comment:  Safety during ambulation, benefit of RLE exercise                               User Key     Initials Effective Dates Name Provider Type Discipline     04/03/18 -  Giovana Shoemaker, PT Physical Therapist PT     04/06/17 -  Mary Sampson, RN Registered Nurse Nurse     03/07/18 -  Sylvie Harp, PTA Physical Therapy Assistant PT    [facility] 06/22/16 -  Emma Carvalho, PT Physical Therapist PT    LC 08/02/16 -  Cesar,  Camron NICOLAS, PT DPT Physical Therapist PT    MA 10/19/18 -  Mohini Mcfadden, LASHONDA Physical Therapist PT                PT Recommendation and Plan  Anticipated Discharge Disposition (PT): skilled nursing facility  Outcome Summary/Treatment Plan (PT)  Anticipated Discharge Disposition (PT): skilled nursing facility  Plan of Care Reviewed With: patient  Progress: improving  Outcome Summary: Pt tolerated treatment well this date. Increased gait distance to 300ft w/ Rw and CGA. Pt then progressed to standing balance activities such as tandem stance, SLS, and backward walking. Pt required at least 1 UE support on rail for dynamic activities, though partial support for static. Pt would benefit from continued skilled PT at rehab before returning home w/out assist.  Outcome Measures     Row Name 02/04/19 1100 02/03/19 1000 02/02/19 1500       How much help from another person do you currently need...    Turning from your back to your side while in flat bed without using bedrails?  4  -SM  4  -LC  3  -SM    Moving from lying on back to sitting on the side of a flat bed without bedrails?  4  -SM  4  -LC  3  -SM    Moving to and from a bed to a chair (including a wheelchair)?  3  -SM  3  -LC  3  -SM    Standing up from a chair using your arms (e.g., wheelchair, bedside chair)?  3  -SM  3  -LC  3  -SM    Climbing 3-5 steps with a railing?  2  -SM  2  -LC  2  -SM    To walk in hospital room?  3  -SM  3  -LC  2  -SM    AM-PAC 6 Clicks Score  19  -SM  19  -LC  16  -SM       Functional Assessment    Outcome Measure Options  AM-PAC 6 Clicks Basic Mobility (PT)  -SM  AM-PAC 6 Clicks Basic Mobility (PT)  -LC  AM-PAC 6 Clicks Basic Mobility (PT)  -SM    Row Name 02/01/19 1500             How much help from another is currently needed...    Putting on and taking off regular lower body clothing?  1  -LE      Bathing (including washing, rinsing, and drying)  2  -LE      Toileting (which includes using toilet bed pan or urinal)  1  -LE      Putting on  and taking off regular upper body clothing  2  -LE      Taking care of personal grooming (such as brushing teeth)  2  -LE      Eating meals  2  -LE      Score  10  -LE         Functional Assessment    Outcome Measure Options  AM-PAC 6 Clicks Daily Activity (OT)  -LE        User Key  (r) = Recorded By, (t) = Taken By, (c) = Cosigned By    Initials Name Provider Type    LE Jasmin Sol, OTR Occupational Therapist    Sylvie Hernandez, PTA Physical Therapy Assistant    Camron Manning, PT DPT Physical Therapist         Time Calculation:   PT Charges     Row Name 02/04/19 1126             Time Calculation    Start Time  1040  -SM      Stop Time  1108  -SM      Time Calculation (min)  28 min  -SM      PT Received On  02/04/19  -      PT - Next Appointment  02/05/19  -        User Key  (r) = Recorded By, (t) = Taken By, (c) = Cosigned By    Initials Name Provider Type    Sylvie Hernandez, PTA Physical Therapy Assistant        Therapy Suggested Charges     Code   Minutes Charges    49394 (CPT®) Hc Pt Neuromusc Re Education Ea 15 Min      47335 (CPT®) Hc Pt Ther Proc Ea 15 Min      50007 (CPT®) Hc Gait Training Ea 15 Min 15 1    60885 (CPT®) Hc Pt Therapeutic Act Ea 15 Min      84399 (CPT®) Hc Pt Manual Therapy Ea 15 Min      97570 (CPT®) Hc Pt Iontophoresis Ea 15 Min      00244 (CPT®) Hc Pt Elec Stim Ea-Per 15 Min      03322 (CPT®) Hc Pt Ultrasound Ea 15 Min      41997 (CPT®) Hc Pt Self Care/Mgmt/Train Ea 15 Min      01109 (CPT®) Hc Pt Prosthetic (S) Train Initial Encounter, Each 15 Min      46039 (CPT®) Hc Pt Orthotic(S)/Prosthetic(S) Encounter, Each 15 Min      42860 (CPT®) Hc Orthotic(S) Mgmt/Train Initial Encounter, Each 15min      Total  15 1        Therapy Charges for Today     Code Description Service Date Service Provider Modifiers Qty    68822089206 HC PT THER PROC EA 15 MIN 2/4/2019 Sylvie Harp PTA GP 2          PT G-Codes  Outcome Measure Options: AM-PAC 6 Clicks Basic Mobility  (PT)  AM-PAC 6 Clicks Score: 19  Score: 10  Modified Milana Scale: 5 - Severe disability.  Bedridden, incontinent, and requiring constant nursing care and attention.    Sylvie Harp, PTA  2/4/2019

## 2019-02-04 NOTE — PROGRESS NOTES
LOS: 7 days   Patient Care Team:  Provider, No Known as PCP - General    Subjective     She's doing well today she says she really realizes now much trouble she does have getting around and she is on board for going to subacute rehabilitation so that she can return home safely.  The only complaint she has today is she having a lot of gas and she usually takes of something for that at home and anti-gas pill she can't remember the name.  Review of Systems:          Objective     Vital Signs  Vital Sign Min/Max for last 24 hours  Temp  Min: 97.4 °F (36.3 °C)  Max: 98.3 °F (36.8 °C)   BP  Min: 144/80  Max: 157/94   Pulse  Min: 64  Max: 84   Resp  Min: 18  Max: 18   SpO2  Min: 93 %  Max: 96 %   No Data Recorded   Weight  Min: 63.8 kg (140 lb 10.5 oz)  Max: 63.8 kg (140 lb 10.5 oz)        Ventilator/Non-Invasive Ventilation Settings (From admission, onward)    None                       Body mass index is 20.18 kg/m².  No intake/output data recorded.  No intake/output data recorded.        Physical Exam:  General Appearance: Well-developed elderly white female resting in bed she is a little hard of hearing she doesn't appear in any acute distress  Eyes: Conjunctiva are clear and anicteric pupils are equal  ENT: Mucous membranes are moist no erythema or exudates don't appreciate any facial asymmetry  Neck: Trachea Midline no palpable adenopathy or thyromegaly  Lungs: Clear nonlabored symmetric expansion  Cardiac: Regular rate and rhythm no murmur  Abdomen: Soft no palpable organomegaly or masses  : Not examined  Musculoskeletal: Grossly normal  Skin: No jaundice, no rashes, no petechiae  Neuro: She is alert oriented cooperative following commands well she has good  both right and left in pretty good dorsiflexion and plantarflexion the left is a little stronger than the right.  She is able to lift and hold her right leg up although she has a little bit of coordination difficulties sort of drifts around the  same with her right arm she is able to hold but she has a little bit of  revolving drift.  Not Present on the left.  Oh real change from yesterday.  Extremities/P Vascular: No clubbing no cyanosis no edema palpable radial dorsalis pedis pulses bilaterally  MSE: Seems to be in very good spirits very pleasant engaged.       Labs:  Results from last 7 days   Lab Units 01/31/19  0353 01/29/19 2209 01/28/19 2033 01/28/19 2025   GLUCOSE mg/dL 101* 95  --  115*   SODIUM mmol/L 139 139  --  139   POTASSIUM mmol/L 3.6 3.5  --  3.1*   CO2 mmol/L 20.5* 19.8*  --  22.3   CHLORIDE mmol/L 105 106  --  102   ANION GAP mmol/L 13.5 13.2  --  14.7   CREATININE mg/dL 0.55* 0.62 0.90 0.93   BUN mg/dL 7* 8  --  13   BUN / CREAT RATIO  12.7 12.9  --  14.0   CALCIUM mg/dL 8.6 8.7  --  9.4   EGFR IF NONAFRICN AM mL/min/1.73 108 94  --  59*   ALK PHOS U/L  --   --   --  78   TOTAL PROTEIN g/dL  --   --   --  6.5   ALT (SGPT) U/L  --   --   --  14   AST (SGOT) U/L  --   --   --  19   BILIRUBIN mg/dL  --   --   --  0.6   ALBUMIN g/dL  --   --   --  4.20   GLOBULIN gm/dL  --   --   --  2.3     Estimated Creatinine Clearance: 62.1 mL/min (A) (by C-G formula based on SCr of 0.55 mg/dL (L)).      Results from last 7 days   Lab Units 01/28/19 2025   WBC 10*3/mm3 6.45   RBC 10*6/mm3 4.84   HEMOGLOBIN g/dL 16.1*   HEMATOCRIT % 45.4   MCV fL 93.8   MCH pg 33.3*   MCHC g/dL 35.5   RDW % 13.2*   RDW-SD fl 45.6   MPV fL 9.1   PLATELETS 10*3/mm3 244   NEUTROPHIL % % 55.4   LYMPHOCYTE % % 26.5   MONOCYTES % % 13.6*   EOSINOPHIL % % 3.7   BASOPHIL % % 0.8   IMM GRAN % % 0.2   NEUTROS ABS 10*3/mm3 3.57   LYMPHS ABS 10*3/mm3 1.71   MONOS ABS 10*3/mm3 0.88   EOS ABS 10*3/mm3 0.24   BASOS ABS 10*3/mm3 0.05   IMMATURE GRANS (ABS) 10*3/mm3 0.01         Results from last 7 days   Lab Units 01/28/19 2025   TROPONIN T ng/mL <0.010         Results from last 7 days   Lab Units 01/28/19 2025   TSH mIU/mL 1.120         Results from last 7 days   Lab Units  01/28/19 2025   INR  1.03     Microbiology Results (last 10 days)     ** No results found for the last 240 hours. **                amLODIPine 5 mg Oral Q24H   aspirin 81 mg Oral Daily   aspirin 300 mg Rectal Q24H   atorvastatin 40 mg Oral Nightly   clopidogrel 75 mg Oral Daily   levothyroxine 125 mcg Oral Daily   multivitamin 1 tablet Oral Daily   nicotine 1 patch Transdermal Q24H   vitamin B-12 1,000 mcg Oral Daily          Diagnostics:  Ct Angiogram Head With & Without Contrast    Result Date: 1/29/2019  EMERGENCY CONTRAST ENHANCED CT ANGIOGRAM OF THE HEAD AND NECK AND CT PERFUSION STUDY OF THE HEAD 01/28/2019  CLINICAL HISTORY: Acute onset slurred speech, right-sided weakness and facial droop.  Noncontrast head CT technique: Spiral CT images were obtained from the base of the skull to vertex without intravenous contrast and images were reformatted and submitted in 3 mm thick axial CT section with brain algorithm and 2 mm thick sagittal and coronal reconstructions were performed with brain algorithm.  FINDINGS: There are patchy areas of low density extending from the periventricular and subcortical white matter of the cerebral hemispheres consistent with mild/moderate small vessel disease. There is a 3 x 2 cm area of encephalomalacia compatible with an old infarct in posterior inferomedial left cerebellum and left PICA territory. The remainder of the brain parenchyma is normal in attenuation. The ventricles are normal in size. I see no midline shift. No extra-axial fluid collections are identified. There is densely calcified mass that is extra-axial and dural based overlying the inferior lateral left frontal lobe and left frontotemporal operculum measuring 2.6 x 1.8 x 2.2 cm in anterior posterior and medial lateral craniocaudal dimension most consistent with a densely calcified meningioma. The paranasal sinuses and the mastoid air cells and middle ear cavities are clear.      1. 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 2. There is densely calcified 2.5 x 1.8 x 2.2 cm meningioma overlying the inferior lateral left frontal lobe and left frontotemporal operculum. The remainder of the head CT is normal. The results were communicated to Dr. Falk from stroke neurology on 01/28/2019 at 8:33 PM and he requested a CT angiogram of the head and neck and CT perfusion study of the head.  CT angiogram head and neck and CT perfusion study technique: Spiral CT images were obtained through the head during arterial phase of contrast and images were reformatted and analyzed with rapid software analysis for CT perfusion study of the head and subsequently spiral CT images were obtained from the top of the aortic arch up through the great vessels of the head and neck during arterial phase of contrast and images were reformatted and submitted in 1 mm thick axial sagittal and coronal CT sections with soft tissue algorithm and additional 3D reconstructions were performed to complete a CT angiogram of the head and neck and finally spiral CT images were obtained from the base of the skull to the vertex delayed following intravenous contrast and these images were reformatted and submitted in 3 mm thick axial CT section with brain algorithm.  CT perfusion study: The CT perfusion images demonstrate no areas of reduced cerebral blood flow to less than 30% with no convincing areas of acute completed infarction in the field of view. Furthermore there are no areas of delayed time to maximal enhancement of greater than 6 seconds with no convincing focal hypoperfused areas seen in the brain.  CT angiogram of the neck: The nasopharynx, oropharynx, hypopharynx, true cords and subglottic airway are normal in appearance. The lung apices are clear. The parotid  parapharyngeal and submandibular spaces are symmetric and normal in  appearance. Cervical spondylosis is present, disc space narrowing and degenerative disc and endplate changes C3-C7, some posterior endplate spurring and uncovertebral joint hypertrophy with mild canal and mild/moderate right foraminal narrowing C3-4, mild canal and mild right foraminal narrowing C4-5, mild canal and foraminal narrowing at C5-6 and C6-7. There is abnormal enlargement of the right neural foramen at C2-3 with mass extending from the right lateral epidural space through the right neural foramen at C2-3 that measures approximately 14 x 14 x 16 mm in size likely a neurofibroma. There is limited evaluation of the great vessel origins off the aortic arch due to beam hardening artifact off densely opacified left brachiocephalic vein. There appears to be a moderate stenosis of the origin and proximal left subclavian artery due to noncalcified atherosclerotic plaque and the distal left subclavian artery is normal in appearance. The left vertebral artery is suboptimally visualized and appears to be likely occluded from its origin to just anterior to the C3-4 interspace level where there is an ascending cervical collateral that aids in reconstituting a tiny diameter diseased left vertebral artery from the C3 cervical level to just above the C1 ring where there is another collateral that hooks into the back wall the upper cervical left vertebral artery and then the left vertebral arteries is patent to the vertebrobasilar junction although there may be an additional moderate stenosis of the distal intracranial segment of the left vertebral artery just proximal to the vertebrobasilar junction. No stenosis seen in the left common carotid artery, its bifurcation and left internal and external carotid arteries within normal limits and no stenosis is seen in the left internal carotid arteries using the NASCET criteria. The brachycephalic artery origin is normal in appearance. No stenosis seen in the brachycephalic  artery, its bifurcation into the right common carotid artery and subclavian arteries are normal in appearance. No stenosis in the right clavian artery origin. The right vertebral artery origin is normal in appearance. The right vertebral artery is the dominant vertebral artery and is widely patent from its origin and the vertebral basilar junction without stenosis. The right common carotid origin is normal in appearance. No stenosis seen in the right common carotid artery, its bifurcation into the right internal and external carotid arteries is within normal limits. No stenosis seen in the right internal carotid artery using the NASCET criteria.  CT angiogram of the head: CT angiogram images through the head demonstrate tiny diameter intracranial segment of the left vertebral artery that appears to be patent and gives off the left posterior inferior cerebellar artery and post PICA segments very tiny in diameter but patent to the vertebrobasilar junction dominant right vertebral artery is widely patent to the vertebrobasilar junction, the basilar artery and basilar tips within normal limits. The posterior cerebral, superior cerebellar arteries are within normal limits. The upper cervical petrous cavernous and supracavernous segments of the internal carotid arteries are within normal limits. The anterior cerebral arteries are within normal limits. There is some nodular prominence at the level of the anterior communicating artery origin measuring 2.6 mm in size probably a junctional infundibulum rather than aneurysm. The M1 segments and middle cerebral arteries and middle cerebral artery trifurcations are within normal limits.  IMPRESSION: 1. 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 There is also a 2.5 x 1.8 x 2.2 cm densely calcified meningioma overlying the inferior  lateral left frontal lobe and extending lateral to the left anterior frontotemporal operculum. 2. CT perfusion study demonstrates no convincing hypoperfused areas or acute completed infarction in the field of view in the brain. If there remains clinical suspicion of an acute infarct, I recommend an MRI of the brain to further evaluate. 3. 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 4. The left vertebral artery is suboptimally assessed and may be occluded from its origin to the C3 cervical level where there is an ascending cervical collateral that reconstitutes a diseased small caliber left vertebral artery from the C3 cervical level to just above the C1 ring where there is another collateral that hooks into the back wall of the left vertebral artery just above the C1 ring and within the small diameter left vertebral artery is widely patent from C1 ring to its distal intracranial segment where there appears to be an additional moderate stenosis just proximal to the vertebrobasilar junction. There appears to be focal dilatation of the collateral branch just proximal to its reconstitution of the left vertebral artery at the level of inferior endplate of C3 where it goes from 1 to 2.5 mm in diameter best seen on axial CT images 130 through 132. This finding can be further assessed with an MRA of the neck. No additional stenosis is seen in the great vessels at the head or neck. Specifically, no stenosis is seen in the internal carotid arteries using the NASCET criteria. The final dictated report was communicated to   Dileep from stroke neurology by telephone on 01/28/2019 at 8:55 PM and also to Dr. Pineda in the emergency room 01/28/2019 at 9:30 PM.  Radiation dose reduction techniques were utilized, including automated exposure control and exposure modulation based on body size.  This report was finalized on 1/29/2019 10:16 AM by Dr. Ludin Stout M.D.      Ct Head Without Contrast    Result Date: 1/29/2019  CT OF THE HEAD WITHOUT CONTRAST  HISTORY: Stroke  COMPARISON: July 28, 2019  TECHNIQUE: Axial CT imaging was obtained from the vertex of the skull through the skull base. No IV contrast was administered.  FINDINGS: No IV contrast was administered for this examination, although the patient did receive contrast for prior stroke protocol. No obvious acute intracranial hemorrhage is identified, but the exam is degraded by the presence of intravenous contrast material. The ventricles are normal in size. Patient is noted to have a large calcified left frontal meningioma. This is not associated with any midline shift or mass effect. There is some mild periventricular deep white matter microangiopathic change. There is an old left cerebellar infarct, as well as an area of decreased attenuation seen within the left basal ganglia, which is also favored to be chronic. No new areas of decreased attenuation are identified. There is no evidence of venous occlusion. Mucosal thickening is seen within the ethmoid sinuses. Mastoid air cells appear clear. No focal soft tissue abnormalities are seen.      No acute intracranial process identified. If symptoms persist, correlation with MRI suggested.  Radiation dose reduction techniques were utilized, including automated exposure control and exposure modulation based on body size.  This report was finalized on 1/29/2019 1:27 AM by Dr. Kadi Gomez M.D.      Ct Angiogram Neck With & Without Contrast    Result Date: 1/29/2019  EMERGENCY CONTRAST ENHANCED CT ANGIOGRAM OF THE HEAD AND NECK AND  CT PERFUSION STUDY OF THE HEAD 01/28/2019  CLINICAL HISTORY: Acute onset slurred speech, right-sided weakness and facial droop.  Noncontrast head CT technique: Spiral CT images were obtained from the base of the skull to vertex without intravenous contrast and images were reformatted and submitted in 3 mm thick axial CT section with brain algorithm and 2 mm thick sagittal and coronal reconstructions were performed with brain algorithm.  FINDINGS: There are patchy areas of low density extending from the periventricular and subcortical white matter of the cerebral hemispheres consistent with mild/moderate small vessel disease. There is a 3 x 2 cm area of encephalomalacia compatible with an old infarct in posterior inferomedial left cerebellum and left PICA territory. The remainder of the brain parenchyma is normal in attenuation. The ventricles are normal in size. I see no midline shift. No extra-axial fluid collections are identified. There is densely calcified mass that is extra-axial and dural based overlying the inferior lateral left frontal lobe and left frontotemporal operculum measuring 2.6 x 1.8 x 2.2 cm in anterior posterior and medial lateral craniocaudal dimension most consistent with a densely calcified meningioma. The paranasal sinuses and the mastoid air cells and middle ear cavities are clear.      1. 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 2. There is densely calcified 2.5 x 1.8 x 2.2 cm meningioma overlying the inferior lateral left frontal lobe and left frontotemporal operculum. The remainder of the head CT is normal. The results were communicated to Dr. Falk from stroke neurology on 01/28/2019 at 8:33 PM and he requested a CT angiogram of the head and neck and CT perfusion study of the head.  CT angiogram head and neck and CT perfusion study technique: Spiral CT images were obtained  through the head during arterial phase of contrast and images were reformatted and analyzed with rapid software analysis for CT perfusion study of the head and subsequently spiral CT images were obtained from the top of the aortic arch up through the great vessels of the head and neck during arterial phase of contrast and images were reformatted and submitted in 1 mm thick axial sagittal and coronal CT sections with soft tissue algorithm and additional 3D reconstructions were performed to complete a CT angiogram of the head and neck and finally spiral CT images were obtained from the base of the skull to the vertex delayed following intravenous contrast and these images were reformatted and submitted in 3 mm thick axial CT section with brain algorithm.  CT perfusion study: The CT perfusion images demonstrate no areas of reduced cerebral blood flow to less than 30% with no convincing areas of acute completed infarction in the field of view. Furthermore there are no areas of delayed time to maximal enhancement of greater than 6 seconds with no convincing focal hypoperfused areas seen in the brain.  CT angiogram of the neck: The nasopharynx, oropharynx, hypopharynx, true cords and subglottic airway are normal in appearance. The lung apices are clear. The parotid  parapharyngeal and submandibular spaces are symmetric and normal in appearance. Cervical spondylosis is present, disc space narrowing and degenerative disc and endplate changes C3-C7, some posterior endplate spurring and uncovertebral joint hypertrophy with mild canal and mild/moderate right foraminal narrowing C3-4, mild canal and mild right foraminal narrowing C4-5, mild canal and foraminal narrowing at C5-6 and C6-7. There is abnormal enlargement of the right neural foramen at C2-3 with mass extending from the right lateral epidural space through the right neural foramen at C2-3 that measures approximately 14 x 14 x 16 mm in size likely a  neurofibroma. There is limited evaluation of the great vessel origins off the aortic arch due to beam hardening artifact off densely opacified left brachiocephalic vein. There appears to be a moderate stenosis of the origin and proximal left subclavian artery due to noncalcified atherosclerotic plaque and the distal left subclavian artery is normal in appearance. The left vertebral artery is suboptimally visualized and appears to be likely occluded from its origin to just anterior to the C3-4 interspace level where there is an ascending cervical collateral that aids in reconstituting a tiny diameter diseased left vertebral artery from the C3 cervical level to just above the C1 ring where there is another collateral that hooks into the back wall the upper cervical left vertebral artery and then the left vertebral arteries is patent to the vertebrobasilar junction although there may be an additional moderate stenosis of the distal intracranial segment of the left vertebral artery just proximal to the vertebrobasilar junction. No stenosis seen in the left common carotid artery, its bifurcation and left internal and external carotid arteries within normal limits and no stenosis is seen in the left internal carotid arteries using the NASCET criteria. The brachycephalic artery origin is normal in appearance. No stenosis seen in the brachycephalic artery, its bifurcation into the right common carotid artery and subclavian arteries are normal in appearance. No stenosis in the right clavian artery origin. The right vertebral artery origin is normal in appearance. The right vertebral artery is the dominant vertebral artery and is widely patent from its origin and the vertebral basilar junction without stenosis. The right common carotid origin is normal in appearance. No stenosis seen in the right common carotid artery, its bifurcation into the right internal and external carotid arteries is within normal limits. No stenosis  seen in the right internal carotid artery using the NASCET criteria.  CT angiogram of the head: CT angiogram images through the head demonstrate tiny diameter intracranial segment of the left vertebral artery that appears to be patent and gives off the left posterior inferior cerebellar artery and post PICA segments very tiny in diameter but patent to the vertebrobasilar junction dominant right vertebral artery is widely patent to the vertebrobasilar junction, the basilar artery and basilar tips within normal limits. The posterior cerebral, superior cerebellar arteries are within normal limits. The upper cervical petrous cavernous and supracavernous segments of the internal carotid arteries are within normal limits. The anterior cerebral arteries are within normal limits. There is some nodular prominence at the level of the anterior communicating artery origin measuring 2.6 mm in size probably a junctional infundibulum rather than aneurysm. The M1 segments and middle cerebral arteries and middle cerebral artery trifurcations are within normal limits.  IMPRESSION: 1. 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 There is also a 2.5 x 1.8 x 2.2 cm densely calcified meningioma overlying the inferior lateral left frontal lobe and extending lateral to the left anterior frontotemporal operculum. 2. CT perfusion study demonstrates no convincing hypoperfused areas or acute completed infarction in the field of view in the brain. If there remains clinical suspicion of an acute infarct, I recommend an MRI of the brain to further evaluate. 3. There is cervical spondylosis with degenerative disc and endplate changes C3-C7 and there is a enhancing mass extending from the right lateral epidural space through an expanded right neural foramen at C2-3 with a mass measuring 14 x 14 x 16 mm most  consistent with a right C2 neurofibroma which can be further characterized on a followup contrast enhanced MRI of the cervical spine which can also be used to evaluate for any additional neurofibromas. 4. The left vertebral artery is suboptimally assessed and may be occluded from its origin to the C3 cervical level where there is an ascending cervical collateral that reconstitutes a diseased small caliber left vertebral artery from the C3 cervical level to just above the C1 ring where there is another collateral that hooks into the back wall of the left vertebral artery just above the C1 ring and within the small diameter left vertebral artery is widely patent from C1 ring to its distal intracranial segment where there appears to be an additional moderate stenosis just proximal to the vertebrobasilar junction. There appears to be focal dilatation of the collateral branch just proximal to its reconstitution of the left vertebral artery at the level of inferior endplate of C3 where it goes from 1 to 2.5 mm in diameter best seen on axial CT images 130 through 132. This finding can be further assessed with an MRA of the neck. No additional stenosis is seen in the great vessels at the head or neck. Specifically, no stenosis is seen in the internal carotid arteries using the NASCET criteria. The final dictated report was communicated to Dr. Falk from stroke neurology by telephone on 01/28/2019 at 8:55 PM and also to Dr. Pineda in the emergency room 01/28/2019 at 9:30 PM.  Radiation dose reduction techniques were utilized, including automated exposure control and exposure modulation based on body size.  This report was finalized on 1/29/2019 10:16 AM by Dr. Ludin Stout M.D.      Mri Brain Without Contrast    Addendum Date: 1/29/2019    Findings were relayed to the patient's nurse at 10:36 PM on January 29, 2019.  This report was finalized on 1/29/2019 10:37 PM by Dr. Kadi Gomez M.D.      Result Date:  1/29/2019  BRAIN MRI WITHOUT CONTRAST  HISTORY: Stroke  COMPARISON: None.  FINDINGS:  Multiplanar images of the head were obtained without gadolinium. Study is positive for an area of restricted diffusion within the posterior limb of the left internal capsule and left corona radiata. Maximum dimensions are 1.4 x 1.0 cm. No additional areas of restricted diffusion are seen. The patient does have an area of encephalomalacia within the left cerebellar hemisphere. There is no evidence of hemorrhagic transformation on gradient echo images. Patient's peripherally calcified left frontal meningioma is again noted. It measures up to 2.4 x 1.7 cm. There is diffuse cerebral atrophy, in keeping with the age of 74. There is periventricular and deep white matter microangiopathic change. There is no midline shift or mass effect. Mild mucosal thickening is seen within the ethmoid sinuses. Old lacunar infarct is seen within the left basal ganglia. Intracranial flow-voids appear intact      1. Study is positive for area of acute infarct within the posterior limb of the left internal capsule and left corona radiata. There is no associated midline shift or mass effect. No additional areas of restricted diffusion are identified.  This report was finalized on 1/29/2019 9:21 PM by Dr. Kadi Gomez M.D.      Xr Chest 1 View    Result Date: 1/28/2019  PORTABLE CHEST RADIOGRAPH  HISTORY: Acute stroke protocol  COMPARISON: None available.  FINDINGS: Heart size is within normal limits for portable technique. There is tortuosity of the thoracic aorta. No pneumothorax or pleural effusion is seen. Patient is noted to have bibasilar atelectasis.      Bibasilar atelectasis.  This report was finalized on 1/28/2019 9:42 PM by Dr. Kdai Gomez M.D.      Xr Spine Lumbar 1 View    Result Date: 1/29/2019  XR SPINE LUMBAR 1 VW-  INDICATIONS:    Back pain  TECHNIQUE: A FRONTAL VIEW OF THE LUMBAR SPINE  COMPARISON: None available  FINDINGS:  The  image is limited by overpenetration. S-shaped scoliosis of the thoracolumbar spine is seen. No obvious displaced fracture is noted on this single view. Multilevel endplate spurring is apparent. There appears to be disc space narrowing at L1-L3, although suboptimally characterized with this technique. Arterial calcifications are seen. Further evaluation can be obtained as indicated.       As described.  This report was finalized on 1/29/2019 2:37 PM by Dr. Jj Richardson M.D.      Fl Video Swallow    Result Date: 1/31/2019  VIDEO SWALLOW STUDY  HISTORY: Dysphagia.  3 minutes 20 seconds fluoroscopy was provided for the speech pathologist during a video swallow study. 4,069 images were saved.  Laryngeal penetration and aspiration were observed during the exam.      Fluoroscopy was provided for the speech pathologist during a video swallow study. For full details please see the speech pathology report.  This report was finalized on 1/31/2019 6:28 PM by Dr. Raf Uriarte M.D.      Ct Cerebral Perfusion With & Without Contrast    Result Date: 1/29/2019  EMERGENCY CONTRAST ENHANCED CT ANGIOGRAM OF THE HEAD AND NECK AND CT PERFUSION STUDY OF THE HEAD 01/28/2019  CLINICAL HISTORY: Acute onset slurred speech, right-sided weakness and facial droop.  Noncontrast head CT technique: Spiral CT images were obtained from the base of the skull to vertex without intravenous contrast and images were reformatted and submitted in 3 mm thick axial CT section with brain algorithm and 2 mm thick sagittal and coronal reconstructions were performed with brain algorithm.  FINDINGS: There are patchy areas of low density extending from the periventricular and subcortical white matter of the cerebral hemispheres consistent with mild/moderate small vessel disease. There is a 3 x 2 cm area of encephalomalacia compatible with an old infarct in posterior inferomedial left cerebellum and left PICA territory. The remainder of the brain parenchyma  is normal in attenuation. The ventricles are normal in size. I see no midline shift. No extra-axial fluid collections are identified. There is densely calcified mass that is extra-axial and dural based overlying the inferior lateral left frontal lobe and left frontotemporal operculum measuring 2.6 x 1.8 x 2.2 cm in anterior posterior and medial lateral craniocaudal dimension most consistent with a densely calcified meningioma. The paranasal sinuses and the mastoid air cells and middle ear cavities are clear.      1. 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 2. There is densely calcified 2.5 x 1.8 x 2.2 cm meningioma overlying the inferior lateral left frontal lobe and left frontotemporal operculum. The remainder of the head CT is normal. The results were communicated to Dr. Falk from stroke neurology on 01/28/2019 at 8:33 PM and he requested a CT angiogram of the head and neck and CT perfusion study of the head.  CT angiogram head and neck and CT perfusion study technique: Spiral CT images were obtained through the head during arterial phase of contrast and images were reformatted and analyzed with rapid software analysis for CT perfusion study of the head and subsequently spiral CT images were obtained from the top of the aortic arch up through the great vessels of the head and neck during arterial phase of contrast and images were reformatted and submitted in 1 mm thick axial sagittal and coronal CT sections with soft tissue algorithm and additional 3D reconstructions were performed to complete a CT angiogram of the head and neck and finally spiral CT images were obtained from the base of the skull to the vertex delayed following intravenous contrast and these images were reformatted and submitted in 3 mm thick axial CT section with brain algorithm.  CT perfusion study: The CT perfusion images  demonstrate no areas of reduced cerebral blood flow to less than 30% with no convincing areas of acute completed infarction in the field of view. Furthermore there are no areas of delayed time to maximal enhancement of greater than 6 seconds with no convincing focal hypoperfused areas seen in the brain.  CT angiogram of the neck: The nasopharynx, oropharynx, hypopharynx, true cords and subglottic airway are normal in appearance. The lung apices are clear. The parotid  parapharyngeal and submandibular spaces are symmetric and normal in appearance. Cervical spondylosis is present, disc space narrowing and degenerative disc and endplate changes C3-C7, some posterior endplate spurring and uncovertebral joint hypertrophy with mild canal and mild/moderate right foraminal narrowing C3-4, mild canal and mild right foraminal narrowing C4-5, mild canal and foraminal narrowing at C5-6 and C6-7. There is abnormal enlargement of the right neural foramen at C2-3 with mass extending from the right lateral epidural space through the right neural foramen at C2-3 that measures approximately 14 x 14 x 16 mm in size likely a neurofibroma. There is limited evaluation of the great vessel origins off the aortic arch due to beam hardening artifact off densely opacified left brachiocephalic vein. There appears to be a moderate stenosis of the origin and proximal left subclavian artery due to noncalcified atherosclerotic plaque and the distal left subclavian artery is normal in appearance. The left vertebral artery is suboptimally visualized and appears to be likely occluded from its origin to just anterior to the C3-4 interspace level where there is an ascending cervical collateral that aids in reconstituting a tiny diameter diseased left vertebral artery from the C3 cervical level to just above the C1 ring where there is another collateral that hooks into the back wall the upper cervical left vertebral artery and then the left  vertebral arteries is patent to the vertebrobasilar junction although there may be an additional moderate stenosis of the distal intracranial segment of the left vertebral artery just proximal to the vertebrobasilar junction. No stenosis seen in the left common carotid artery, its bifurcation and left internal and external carotid arteries within normal limits and no stenosis is seen in the left internal carotid arteries using the NASCET criteria. The brachycephalic artery origin is normal in appearance. No stenosis seen in the brachycephalic artery, its bifurcation into the right common carotid artery and subclavian arteries are normal in appearance. No stenosis in the right clavian artery origin. The right vertebral artery origin is normal in appearance. The right vertebral artery is the dominant vertebral artery and is widely patent from its origin and the vertebral basilar junction without stenosis. The right common carotid origin is normal in appearance. No stenosis seen in the right common carotid artery, its bifurcation into the right internal and external carotid arteries is within normal limits. No stenosis seen in the right internal carotid artery using the NASCET criteria.  CT angiogram of the head: CT angiogram images through the head demonstrate tiny diameter intracranial segment of the left vertebral artery that appears to be patent and gives off the left posterior inferior cerebellar artery and post PICA segments very tiny in diameter but patent to the vertebrobasilar junction dominant right vertebral artery is widely patent to the vertebrobasilar junction, the basilar artery and basilar tips within normal limits. The posterior cerebral, superior cerebellar arteries are within normal limits. The upper cervical petrous cavernous and supracavernous segments of the internal carotid arteries are within normal limits. The anterior cerebral arteries are within normal limits. There is some nodular prominence  at the level of the anterior communicating artery origin measuring 2.6 mm in size probably a junctional infundibulum rather than aneurysm. The M1 segments and middle cerebral arteries and middle cerebral artery trifurcations are within normal limits.  IMPRESSION: 1. 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 There is also a 2.5 x 1.8 x 2.2 cm densely calcified meningioma overlying the inferior lateral left frontal lobe and extending lateral to the left anterior frontotemporal operculum. 2. CT perfusion study demonstrates no convincing hypoperfused areas or acute completed infarction in the field of view in the brain. If there remains clinical suspicion of an acute infarct, I recommend an MRI of the brain to further evaluate. 3. 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 4. The left vertebral artery is suboptimally assessed and may be occluded from its origin to the C3 cervical level where there is an ascending cervical collateral that reconstitutes a diseased small caliber left vertebral artery from the C3 cervical level to just above the C1 ring where there is another collateral that hooks into the back wall of the left vertebral artery just above the C1 ring and within the small diameter left vertebral artery is widely patent from C1 ring to its distal intracranial segment where there appears to be an additional moderate stenosis just proximal to the vertebrobasilar  junction. There appears to be focal dilatation of the collateral branch just proximal to its reconstitution of the left vertebral artery at the level of inferior endplate of C3 where it goes from 1 to 2.5 mm in diameter best seen on axial CT images 130 through 132. This finding can be further assessed with an MRA of the neck. No additional stenosis is seen in the great vessels at the head or neck. Specifically, no stenosis is seen in the internal carotid arteries using the NASCET criteria. The final dictated report was communicated to Dr. Falk from stroke neurology by telephone on 01/28/2019 at 8:55 PM and also to Dr. Pineda in the emergency room 01/28/2019 at 9:30 PM.  Radiation dose reduction techniques were utilized, including automated exposure control and exposure modulation based on body size.  This report was finalized on 1/29/2019 10:16 AM by Dr. Ludin Stout M.D.      Results for orders placed during the hospital encounter of 01/28/19   Adult Transthoracic Echo Complete W/ Cont if Necessary Per Protocol    Narrative · Left ventricular systolic function is normal. Calculated EF = 65%.   Estimated EF was in agreement with the calculated EF. Estimated EF = 65%.   Normal left ventricular cavity size noted. All left ventricular wall   segments contract normally. Left ventricular wall thickness is consistent   with mild concentric hypertrophy. Left ventricular diastolic dysfunction   is noted (grade I) consistent with impaired relaxation.  · Normal left atrial volume noted. Saline test results are negative.  · Severe MAC is present. Mild mitral valve regurgitation is present.  · Trace tricuspid valve regurgitation is present. Estimated right   ventricular systolic pressure from tricuspid regurgitation is normal (<35   mmHg). Calculated right ventricular systolic pressure from tricuspid   regurgitation is 20 mmHg.              Active Hospital Problems    Diagnosis Date Noted   • Acute CVA (cerebrovascular  accident) (CMS/[facility]) [I63.9] 01/28/2019      Resolved Hospital Problems   No resolved problems to display.         Assessment/Plan     1. Left internal capsule ischemic CVA status post TPA workup completed except she'll need outpatient rhythm monitoring( zio patch) and neurology recommends dual antiplatelet therapy aspirin and Plavix for 3 months and then single agent Lipitor 40 mg daily  2. Right hemiparesis secondary to #1  3. Oral pharyngeal dysphagia secondary to #1 on dysphagia diet  4.  hypertension blood pressure was remaining a little high we'll have to increase medications and gradually bring this down  5. Hypothyroidism on replacement  6. Incidental meningioma  7. 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  tobacco abuse needs to stop smoking.  I have discussed with her    Plan for disposition: Patient is medically ready awaiting precertification for subacute rehabilitation    Ady Gomez MD  02/04/19  5:55 PM    Time:

## 2019-02-04 NOTE — NURSING NOTE
"Nsg requested consult to assess small \"sore/abscess\" on pt's lower back.  Pt has very small pustule; no drainage, no redness of surrounding skin.  Unsure of etiology but not pressure.  Pt states she has had these before and they scab and heal; pt unsure of what causes them.  Recommend leave open to air  "

## 2019-02-04 NOTE — PROGRESS NOTES
Continued Stay Note  [facility]     Patient Name: Sommer Smith  MRN: [id number]  Today's Date: 2/4/2019    Admit Date: 1/28/2019    Discharge Plan     Row Name 02/04/19 1623       Plan    Plan  Plans are to skilled rehab @ [facility] pending precert.     Plan Comments  CCP spoke with pt @ bedside, she states she will go to rehab if covered by insurance, due to fixed income.  Pt does not want Acute rehab as sh will have a copay for 1st 7 days.  Bahman Castillo checked benefits, and pt has 100% coverage pending precert.  Updated pt.  Precert pending.  Melissa Linares RN        Discharge Codes    No documentation.             Melissa Linares RN

## 2019-02-04 NOTE — PLAN OF CARE
Problem: Fall Risk (Adult)  Goal: Absence of Fall  Outcome: Ongoing (interventions implemented as appropriate)      Problem: Skin Injury Risk (Adult)  Goal: Skin Health and Integrity  Outcome: Ongoing (interventions implemented as appropriate)      Problem: Patient Care Overview  Goal: Plan of Care Review  Outcome: Ongoing (interventions implemented as appropriate)   02/04/19 7384   Plan of Care Review   Progress improving   OTHER   Outcome Summary VSS. NIH 4 d/t right sided weakness and dysarthria. Patient ambulating with stand by assistance and walker. Patient has small, abscess like sore on lower back, wound consulted to evaluate. Awaiting precert on possible rehabs. D/c soon. Will continue to monitor.    Coping/Psychosocial   Plan of Care Reviewed With patient       Problem: Stroke (Ischemic) (Adult)  Goal: Signs and Symptoms of Listed Potential Problems Will be Absent, Minimized or Managed (Stroke)  Outcome: Ongoing (interventions implemented as appropriate)      Problem: Pain, Acute (Adult)  Goal: Acceptable Pain Control/Comfort Level  Outcome: Ongoing (interventions implemented as appropriate)      Problem: Dysphagia (Adult)  Goal: Compensatory Techniques to Improve Safety/Function with Swallowing  Outcome: Ongoing (interventions implemented as appropriate)

## 2019-02-04 NOTE — PLAN OF CARE
Problem: Patient Care Overview  Goal: Plan of Care Review  Outcome: Ongoing (interventions implemented as appropriate)   02/04/19 1123   Plan of Care Review   Progress improving   OTHER   Outcome Summary Pt tolerated treatment well this date. Increased gait distance to 300ft w/ Rw and CGA. Pt then progressed to standing balance activities such as tandem stance, SLS, and backward walking. Pt required at least 1 UE support on rail for dynamic activities, though partial support for static. Pt would benefit from continued skilled PT at rehab before returning home w/out assist.   Coping/Psychosocial   Plan of Care Reviewed With patient

## 2019-02-05 PROCEDURE — 97110 THERAPEUTIC EXERCISES: CPT

## 2019-02-05 RX ORDER — AMLODIPINE BESYLATE 10 MG/1
10 TABLET ORAL
Status: DISCONTINUED | OUTPATIENT
Start: 2019-02-06 | End: 2019-02-05

## 2019-02-05 RX ORDER — LISINOPRIL 5 MG/1
5 TABLET ORAL
Status: DISCONTINUED | OUTPATIENT
Start: 2019-02-05 | End: 2019-02-06 | Stop reason: HOSPADM

## 2019-02-05 RX ORDER — AMLODIPINE BESYLATE 10 MG/1
10 TABLET ORAL
Status: DISCONTINUED | OUTPATIENT
Start: 2019-02-05 | End: 2019-02-06 | Stop reason: HOSPADM

## 2019-02-05 RX ADMIN — AMLODIPINE BESYLATE 5 MG: 5 TABLET ORAL at 08:01

## 2019-02-05 RX ADMIN — ACETAMINOPHEN 500 MG: 500 TABLET, FILM COATED ORAL at 07:53

## 2019-02-05 RX ADMIN — SODIUM CHLORIDE, PRESERVATIVE FREE 10 ML: 5 INJECTION INTRAVENOUS at 20:30

## 2019-02-05 RX ADMIN — LEVOTHYROXINE SODIUM 125 MCG: 125 TABLET ORAL at 08:01

## 2019-02-05 RX ADMIN — LISINOPRIL 5 MG: 5 TABLET ORAL at 18:19

## 2019-02-05 RX ADMIN — NICOTINE 1 PATCH: 21 PATCH, EXTENDED RELEASE TRANSDERMAL at 08:00

## 2019-02-05 RX ADMIN — ACETAMINOPHEN 500 MG: 500 TABLET, FILM COATED ORAL at 18:22

## 2019-02-05 RX ADMIN — CLOPIDOGREL 75 MG: 75 TABLET, FILM COATED ORAL at 08:01

## 2019-02-05 RX ADMIN — ASPIRIN 81 MG: 81 TABLET, CHEWABLE ORAL at 08:01

## 2019-02-05 RX ADMIN — Medication 1000 MCG: at 08:01

## 2019-02-05 RX ADMIN — Medication 1 TABLET: at 08:01

## 2019-02-05 RX ADMIN — ATORVASTATIN CALCIUM 40 MG: 20 TABLET, FILM COATED ORAL at 20:29

## 2019-02-05 RX ADMIN — AMLODIPINE BESYLATE 10 MG: 10 TABLET ORAL at 16:10

## 2019-02-05 NOTE — NURSING NOTE
Son concerned about pt being dc'ed home. Thinks she is not thinking rationally and is emotionally unstable from sleep deprivation. Pt AOX4, ambulating stand by with walker. Will ask Dr. Gomez call son.

## 2019-02-05 NOTE — PLAN OF CARE
Problem: Fall Risk (Adult)  Goal: Absence of Fall  Outcome: Ongoing (interventions implemented as appropriate)      Problem: Skin Injury Risk (Adult)  Goal: Skin Health and Integrity  Outcome: Ongoing (interventions implemented as appropriate)      Problem: Patient Care Overview  Goal: Plan of Care Review  Outcome: Ongoing (interventions implemented as appropriate)   02/05/19 0744   Plan of Care Review   Progress improving   OTHER   Outcome Summary Elevated BP, nih-3, no falls, c/o pain, prn tylenol, ambulating stand by, poss dc home with HH, continue to monitor   Coping/Psychosocial   Plan of Care Reviewed With patient     Goal: Individualization and Mutuality  Outcome: Ongoing (interventions implemented as appropriate)      Problem: Stroke (Ischemic) (Adult)  Goal: Signs and Symptoms of Listed Potential Problems Will be Absent, Minimized or Managed (Stroke)  Outcome: Ongoing (interventions implemented as appropriate)      Problem: Pain, Acute (Adult)  Goal: Acceptable Pain Control/Comfort Level  Outcome: Ongoing (interventions implemented as appropriate)

## 2019-02-05 NOTE — SIGNIFICANT NOTE
02/05/19 1418   PT Discharge Summary   Anticipated Discharge Disposition (PT) home   Reason for Discharge All goals achieved   Outcomes Achieved Able to achieve all goals within established timeline   Discharge Destination Home

## 2019-02-05 NOTE — PROGRESS NOTES
Continued Stay Note  [facility]     Patient Name: Sommer Smith  MRN: [id number]  Today's Date: 2/5/2019    Admit Date: 1/28/2019    Discharge Plan     Row Name 02/05/19 2316       Plan    Plan Comments  [facility] recieved call from [facility] today, that pt had no PMD listed.  [facility] spoke with pt and she states she sees Dr. Sylvie Schulz     Row Name 02/05/19 3622       Plan    Plan  No word on precert from insurance.  [facility] spoke with pt @ bedside today, and she feels she can return home.  She is agreeable to have [facility] follow her @ home.  Son, Phillip feels pt is not ready for dc to home @ this time.  He feels his mother should go to rehab.  [facility] will follow. Melissa Linares RN        Discharge Codes    No documentation.             Melissa Linares RN

## 2019-02-05 NOTE — NURSING NOTE
Noted pt choice for subacute rehab and precert started. Sill sign off    Liz Hammond RN  Acute Rehab Admission Nurse

## 2019-02-05 NOTE — THERAPY TREATMENT NOTE
Acute Care - Physical Therapy Treatment Note  [facility]     Patient Name: Sommer Smith  : 1945  MRN: [id number]  Today's Date: 2019        Referring Physician: Blossom    Admit Date: 2019    Visit Dx:    ICD-10-CM ICD-9-CM   1. Acute CVA (cerebrovascular accident) (CMS/[facility]) I63.9 434.91   2. Decreased mobility R26.89 781.99     Patient Active Problem List   Diagnosis   • Acute CVA (cerebrovascular accident) (CMS/HCC)       Therapy Treatment    Rehabilitation Treatment Summary     Row Name 19 1408             Treatment Time/Intention    Discipline  physical therapy assistant  -      Document Type  therapy note (daily note)  -      Subjective Information  no complaints  -      Mode of Treatment  physical therapy  -      Patient/Family Observations  pt supine in bed  -EH      Care Plan Review  patient/other agree to care plan  -      Therapy Frequency (PT Clinical Impression)  daily  -EH      Patient Effort  good  -EH      Existing Precautions/Restrictions  fall  -EH      Recorded by [] Cleo Hudson PTA 19 1416      Row Name 19 1408             Cognitive Assessment/Intervention- PT/OT    Orientation Status (Cognition)  oriented to;person;place  -EH      Follows Commands (Cognition)  follows one step commands;75-90% accuracy  -      Safety Deficit (Cognitive)  mild deficit;awareness of need for assistance;insight into deficits/self awareness  -      Personal Safety Interventions  fall prevention program maintained;gait belt;nonskid shoes/slippers when out of bed  -EH      Recorded by [] Cleo Hudson PTA 19 1416      Row Name 19 1408             Bed Mobility Assessment/Treatment    Supine-Sit Beecher (Bed Mobility)  independent  -      Sit-Supine Beecher (Bed Mobility)  independent  -EH      Recorded by [] Cleo Hudson PTA 19 1416      Row Name 19 1408             Sit-Stand Transfer    Sit-Stand Beecher (Transfers)   stand by assist  -      Recorded by [] Cleo Hudson, [facility] 02/05/19 1416      Row Name 02/05/19 1408             Stand-Sit Transfer    Stand-Sit Yancey (Transfers)  stand by assist  -      Recorded by [] Cleo Hudson, [facility] 02/05/19 1416      Row Name 02/05/19 1408             Gait/Stairs Assessment/Training    Yancey Level (Gait)  contact guard;verbal cues  -      Assistive Device (Gait)  walker, front-wheeled  -      Distance in Feet (Gait)  300  -EH      Pattern (Gait)  step-through  -EH      Deviations/Abnormal Patterns (Gait)  right sided deviations  -EH      Recorded by [] Cleo Hudson, [facility] 02/05/19 1416      Row Name 02/05/19 1408             Positioning and Restraints    Pre-Treatment Position  in bed  -      Post Treatment Position  bed  -      In Bed  supine;call light within reach;encouraged to call for assist;exit alarm on  -EH      Recorded by [] Cleo Hudson, [facility] 02/05/19 1416        User Key  (r) = Recorded By, (t) = Taken By, (c) = Cosigned By    Initials Name Effective Dates Discipline     Cleo Hudson, [facility] 08/19/18 -  PT                   Physical Therapy Education     Title: PT OT SLP Therapies (Done)     Topic: Physical Therapy (Done)     Point: Mobility training (Done)     Learning Progress Summary           Patient Acceptance, E,TB,D, VU,NR by  at 2/4/2019 11:23 AM    Acceptance, E,D, VU,DU by SILAS at 2/3/2019 10:48 AM    Comment:  Safety during ambulation, benefit of RLE exercise    Acceptance, E,D, VU,NR by GENOVEVA at 2/2/2019  3:48 PM    Acceptance, E,TB, VU by KRISTEL at 2/1/2019 10:06 AM    Acceptance, E,TB,D, DU,NR by MITA at 2/1/2019  4:54 AM    Acceptance, D, DU,NR by WHITNEY at 1/31/2019 10:51 AM    Acceptance, E, VU,NR by MA at 1/30/2019  3:43 PM   Family Acceptance, E,D, VU,DU by SILAS at 2/3/2019 10:48 AM    Comment:  Safety during ambulation, benefit of RLE exercise                   Point: Home exercise program (Done)     Learning Progress Summary           Patient  Acceptance, E,TB,D, VU,NR by  at 2/4/2019 11:23 AM    Acceptance, E,D, VU,DU by SILAS at 2/3/2019 10:48 AM    Comment:  Safety during ambulation, benefit of RLE exercise    Acceptance, E,D, VU,NR by  at 2/2/2019  3:48 PM    Acceptance, E,TB,D, DU,NR by MITA at 2/1/2019  4:54 AM    Acceptance, D, DU,NR by WHITNEY at 1/31/2019 10:51 AM    Acceptance, E, VU,NR by MA at 1/30/2019  3:43 PM   Family Acceptance, E,D, VU,DU by SILAS at 2/3/2019 10:48 AM    Comment:  Safety during ambulation, benefit of RLE exercise                   Point: Body mechanics (Done)     Learning Progress Summary           Patient Acceptance, E,TB,D, VU,NR by  at 2/4/2019 11:23 AM    Acceptance, E,D, VU,DU by SILAS at 2/3/2019 10:48 AM    Comment:  Safety during ambulation, benefit of RLE exercise    Acceptance, E,D, VU,NR by  at 2/2/2019  3:48 PM    Acceptance, E,TB, VU by  at 2/1/2019 10:06 AM    Acceptance, E,TB,D, DU,NR by MITA at 2/1/2019  4:54 AM    Acceptance, D, DU,NR by WHITNEY at 1/31/2019 10:51 AM    Acceptance, E, VU,NR by MA at 1/30/2019  3:43 PM   Family Acceptance, E,D, VU,DU by SILAS at 2/3/2019 10:48 AM    Comment:  Safety during ambulation, benefit of RLE exercise                   Point: Precautions (Done)     Learning Progress Summary           Patient Acceptance, E,TB,D, VU,NR by  at 2/4/2019 11:23 AM    Acceptance, E,D, VU,DU by SILAS at 2/3/2019 10:48 AM    Comment:  Safety during ambulation, benefit of RLE exercise    Acceptance, E,D, VU,NR by  at 2/2/2019  3:48 PM    Acceptance, E,TB, VU by KRISTEL at 2/1/2019 10:06 AM    Acceptance, E,TB,D, DU,NR by MITA at 2/1/2019  4:54 AM    Acceptance, D, DU,NR by WHITNEY at 1/31/2019 10:51 AM    Acceptance, ROYCE FINNEYNR by MA at 1/30/2019  3:43 PM   Family Acceptance, VARUN FINNEY VU, DU by SILAS at 2/3/2019 10:48 AM    Comment:  Safety during ambulation, benefit of RLE exercise                               User Key     Initials Effective Dates Name Provider Type Discipline     04/03/18 -  Giovana Shoemaker, PT Physical  Therapist PT     04/06/17 -  Mary Sampson, RN Registered Nurse Nurse     03/07/18 -  Sylvie Harp PTA Physical Therapy Assistant PT     06/22/16 -  Emma Carvalho, PT Physical Therapist PT     08/02/16 -  Camron Guerrero, PT DPT Physical Therapist PT    MA 10/19/18 -  Mohini Mcfadden, PT Physical Therapist PT                PT Recommendation and Plan  Anticipated Discharge Disposition (PT): home  Therapy Frequency (PT Clinical Impression): daily  Outcome Summary/Treatment Plan (PT)  Anticipated Discharge Disposition (PT): home  Plan of Care Reviewed With: patient  Progress: improving  Outcome Summary: Pt tolerated treatment with no complaints. pt ambulated 300 feet with rwx, CGA. Pt is independent for bed mobility and standby assist with transfers.   Outcome Measures     Row Name 02/04/19 1100 02/03/19 1000 02/02/19 1500       How much help from another person do you currently need...    Turning from your back to your side while in flat bed without using bedrails?  4  -SM  4  -LC  3  -SM    Moving from lying on back to sitting on the side of a flat bed without bedrails?  4  -SM  4  -LC  3  -SM    Moving to and from a bed to a chair (including a wheelchair)?  3  -SM  3  -LC  3  -SM    Standing up from a chair using your arms (e.g., wheelchair, bedside chair)?  3  -SM  3  -LC  3  -SM    Climbing 3-5 steps with a railing?  2  -SM  2  -LC  2  -SM    To walk in hospital room?  3  -SM  3  -LC  2  -SM    AM-PAC 6 Clicks Score  19  -SM  19  -LC  16  -SM       Functional Assessment    Outcome Measure Options  AM-PAC 6 Clicks Basic Mobility (PT)  -  AM-PAC 6 Clicks Basic Mobility (PT)  -  AM-PAC 6 Clicks Basic Mobility (PT)  -      User Key  (r) = Recorded By, (t) = Taken By, (c) = Cosigned By    Initials Name Provider Type     Sylvie Harp PTA Physical Therapy Assistant     Camron Guerrero, PT DPT Physical Therapist         Time Calculation:   PT Charges     Row Name 02/05/19 8782              Time Calculation    Start Time  1352  -      Stop Time  1400  -      Time Calculation (min)  8 min  -EH      PT Received On  02/05/19  -      PT - Next Appointment  02/06/19  -         Time Calculation- PT    Total Timed Code Minutes- PT  8 minute(s)  -        User Key  (r) = Recorded By, (t) = Taken By, (c) = Cosigned By    Initials Name Provider Type     Cleo Hudson PTA Physical Therapy Assistant        Therapy Suggested Charges     Code   Minutes Charges    21600 (CPT®) Hc Pt Neuromusc Re Education Ea 15 Min      63993 (CPT®) Hc Pt Ther Proc Ea 15 Min      86264 (CPT®) Hc Gait Training Ea 15 Min 15 1    93237 (CPT®) Hc Pt Therapeutic Act Ea 15 Min      77602 (CPT®) Hc Pt Manual Therapy Ea 15 Min      07825 (CPT®) Hc Pt Iontophoresis Ea 15 Min      34722 (CPT®) Hc Pt Elec Stim Ea-Per 15 Min      23751 (CPT®) Hc Pt Ultrasound Ea 15 Min      30244 (CPT®) Hc Pt Self Care/Mgmt/Train Ea 15 Min      16910 (CPT®) Hc Pt Prosthetic (S) Train Initial Encounter, Each 15 Min      13389 (CPT®) Hc Pt Orthotic(S)/Prosthetic(S) Encounter, Each 15 Min      25207 (CPT®) Hc Orthotic(S) Mgmt/Train Initial Encounter, Each 15min      Total  15 1        Therapy Charges for Today     Code Description Service Date Service Provider Modifiers Qty    81020310605 HC PT THER PROC EA 15 MIN 2/5/2019 Cleo Hudson PTA GP 1          PT G-Codes  Outcome Measure Options: AM-PAC 6 Clicks Basic Mobility (PT)  AM-PAC 6 Clicks Score: 19  Score: 10  Modified Milana Scale: 5 - Severe disability.  Bedridden, incontinent, and requiring constant nursing care and attention.    Cleo Hudson PTA  2/5/2019

## 2019-02-05 NOTE — PLAN OF CARE
Problem: Fall Risk (Adult)  Goal: Absence of Fall  Outcome: Ongoing (interventions implemented as appropriate)      Problem: Skin Injury Risk (Adult)  Goal: Skin Health and Integrity  Outcome: Ongoing (interventions implemented as appropriate)      Problem: Patient Care Overview  Goal: Plan of Care Review  Outcome: Ongoing (interventions implemented as appropriate)   02/05/19 0576   Plan of Care Review   Progress no change   OTHER   Outcome Summary Cont on NIH(3).Pt is alert and oriented x4.Pt is continent of B&B.Stand by assist to bathroom.Pt c/o headache prn Tylenol given with relief noted.Will continue to monitor.   Coping/Psychosocial   Plan of Care Reviewed With patient       Problem: Stroke (Ischemic) (Adult)  Goal: Signs and Symptoms of Listed Potential Problems Will be Absent, Minimized or Managed (Stroke)  Outcome: Ongoing (interventions implemented as appropriate)      Problem: Pain, Acute (Adult)  Goal: Acceptable Pain Control/Comfort Level  Outcome: Ongoing (interventions implemented as appropriate)      Problem: Dysphagia (Adult)  Goal: Functional/Safe Swallow  Outcome: Ongoing (interventions implemented as appropriate)

## 2019-02-05 NOTE — PROGRESS NOTES
LOS: 8 days   Patient Care Team:  Sylvie Schulz MD as PCP - General (Family Medicine)    Subjective     Patient reports she is doing better and nursing and therapy reports she is doing better she will 300 feet today without assistance she's been going to and from the bathroom without assistance.  She apparently doesn't qualify for subacute rehabilitation and does not want to go to subacute rehabilitation.  Her son came in today and apparently she and her son got into quite a argument and she ask him to leave she did ask if I did talk to him and I did call him on the phone his story is somewhat different than hers he says she's being irrational and not listening to reason he says she's always a little on the emotional side but this is more than usual      Review of Systems:          Objective     Vital Signs  Vital Sign Min/Max for last 24 hours  Temp  Min: 97.5 °F (36.4 °C)  Max: 98 °F (36.7 °C)   BP  Min: 151/97  Max: 172/108   Pulse  Min: 70  Max: 79   Resp  Min: 18  Max: 18   SpO2  Min: 93 %  Max: 99 %   No Data Recorded   Weight  Min: 67 kg (147 lb 11.3 oz)  Max: 67 kg (147 lb 11.3 oz)        Ventilator/Non-Invasive Ventilation Settings (From admission, onward)    None                       Body mass index is 21.19 kg/m².  I/O last 3 completed shifts:  In: 20 [P.O.:20]  Out: -   I/O this shift:  In: 600 [P.O.:600]  Out: -         Physical Exam:  General Appearance: Well-developed elderly white female resting in bed she is a little hard of hearing she doesn't appear in any acute distress.  I saw her walk in the room and she did very well  Eyes: Conjunctiva are clear and anicteric pupils are equal  ENT: Mucous membranes are moist no erythema or exudates don't appreciate any facial asymmetry  Neck: Trachea Midline no palpable adenopathy or thyromegaly  Lungs: Clear nonlabored symmetric expansion  Cardiac: Regular rate and rhythm no murmur  Abdomen: Soft no palpable organomegaly or masses  : Not  examined  Musculoskeletal: Grossly normal  Skin: No jaundice, no rashes, no petechiae  Neuro: She is alert oriented cooperative following commands well she has good  both right and left in pretty good dorsiflexion and plantarflexion   Extremities/P Vascular: No clubbing no cyanosis no edema palpable radial dorsalis pedis pulses bilaterally  MSE: Seems to be in very good spirits very pleasant engaged.       Labs:  Results from last 7 days   Lab Units 01/31/19  0353 01/29/19  2209   GLUCOSE mg/dL 101* 95   SODIUM mmol/L 139 139   POTASSIUM mmol/L 3.6 3.5   CO2 mmol/L 20.5* 19.8*   CHLORIDE mmol/L 105 106   ANION GAP mmol/L 13.5 13.2   CREATININE mg/dL 0.55* 0.62   BUN mg/dL 7* 8   BUN / CREAT RATIO  12.7 12.9   CALCIUM mg/dL 8.6 8.7   EGFR IF NONAFRICN AM mL/min/1.73 108 94     Estimated Creatinine Clearance: 65.3 mL/min (A) (by C-G formula based on SCr of 0.55 mg/dL (L)).                                  Microbiology Results (last 10 days)     ** No results found for the last 240 hours. **                amLODIPine 10 mg Oral Q24H   aspirin 81 mg Oral Daily   aspirin 300 mg Rectal Q24H   atorvastatin 40 mg Oral Nightly   clopidogrel 75 mg Oral Daily   levothyroxine 125 mcg Oral Daily   lisinopril 5 mg Oral Q24H   multivitamin 1 tablet Oral Daily   nicotine 1 patch Transdermal Q24H   vitamin B-12 1,000 mcg Oral Daily          Diagnostics:  Ct Angiogram Head With & Without Contrast    Result Date: 1/29/2019  EMERGENCY CONTRAST ENHANCED CT ANGIOGRAM OF THE HEAD AND NECK AND CT PERFUSION STUDY OF THE HEAD 01/28/2019  CLINICAL HISTORY: Acute onset slurred speech, right-sided weakness and facial droop.  Noncontrast head CT technique: Spiral CT images were obtained from the base of the skull to vertex without intravenous contrast and images were reformatted and submitted in 3 mm thick axial CT section with brain algorithm and 2 mm thick sagittal and coronal reconstructions were performed with brain algorithm.  FINDINGS:  There are patchy areas of low density extending from the periventricular and subcortical white matter of the cerebral hemispheres consistent with mild/moderate small vessel disease. There is a 3 x 2 cm area of encephalomalacia compatible with an old infarct in posterior inferomedial left cerebellum and left PICA territory. The remainder of the brain parenchyma is normal in attenuation. The ventricles are normal in size. I see no midline shift. No extra-axial fluid collections are identified. There is densely calcified mass that is extra-axial and dural based overlying the inferior lateral left frontal lobe and left frontotemporal operculum measuring 2.6 x 1.8 x 2.2 cm in anterior posterior and medial lateral craniocaudal dimension most consistent with a densely calcified meningioma. The paranasal sinuses and the mastoid air cells and middle ear cavities are clear.      1. 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 2. There is densely calcified 2.5 x 1.8 x 2.2 cm meningioma overlying the inferior lateral left frontal lobe and left frontotemporal operculum. The remainder of the head CT is normal. The results were communicated to Dr. Falk from stroke neurology on 01/28/2019 at 8:33 PM and he requested a CT angiogram of the head and neck and CT perfusion study of the head.  CT angiogram head and neck and CT perfusion study technique: Spiral CT images were obtained through the head during arterial phase of contrast and images were reformatted and analyzed with rapid software analysis for CT perfusion study of the head and subsequently spiral CT images were obtained from the top of the aortic arch up through the great vessels of the head and neck during arterial phase of contrast and images were reformatted and submitted in 1 mm thick axial sagittal and coronal CT sections with soft tissue algorithm and  additional 3D reconstructions were performed to complete a CT angiogram of the head and neck and finally spiral CT images were obtained from the base of the skull to the vertex delayed following intravenous contrast and these images were reformatted and submitted in 3 mm thick axial CT section with brain algorithm.  CT perfusion study: The CT perfusion images demonstrate no areas of reduced cerebral blood flow to less than 30% with no convincing areas of acute completed infarction in the field of view. Furthermore there are no areas of delayed time to maximal enhancement of greater than 6 seconds with no convincing focal hypoperfused areas seen in the brain.  CT angiogram of the neck: The nasopharynx, oropharynx, hypopharynx, true cords and subglottic airway are normal in appearance. The lung apices are clear. The parotid  parapharyngeal and submandibular spaces are symmetric and normal in appearance. Cervical spondylosis is present, disc space narrowing and degenerative disc and endplate changes C3-C7, some posterior endplate spurring and uncovertebral joint hypertrophy with mild canal and mild/moderate right foraminal narrowing C3-4, mild canal and mild right foraminal narrowing C4-5, mild canal and foraminal narrowing at C5-6 and C6-7. There is abnormal enlargement of the right neural foramen at C2-3 with mass extending from the right lateral epidural space through the right neural foramen at C2-3 that measures approximately 14 x 14 x 16 mm in size likely a neurofibroma. There is limited evaluation of the great vessel origins off the aortic arch due to beam hardening artifact off densely opacified left brachiocephalic vein. There appears to be a moderate stenosis of the origin and proximal left subclavian artery due to noncalcified atherosclerotic plaque and the distal left subclavian artery is normal in appearance. The left vertebral artery is suboptimally visualized and appears to be likely occluded  from its origin to just anterior to the C3-4 interspace level where there is an ascending cervical collateral that aids in reconstituting a tiny diameter diseased left vertebral artery from the C3 cervical level to just above the C1 ring where there is another collateral that hooks into the back wall the upper cervical left vertebral artery and then the left vertebral arteries is patent to the vertebrobasilar junction although there may be an additional moderate stenosis of the distal intracranial segment of the left vertebral artery just proximal to the vertebrobasilar junction. No stenosis seen in the left common carotid artery, its bifurcation and left internal and external carotid arteries within normal limits and no stenosis is seen in the left internal carotid arteries using the NASCET criteria. The brachycephalic artery origin is normal in appearance. No stenosis seen in the brachycephalic artery, its bifurcation into the right common carotid artery and subclavian arteries are normal in appearance. No stenosis in the right clavian artery origin. The right vertebral artery origin is normal in appearance. The right vertebral artery is the dominant vertebral artery and is widely patent from its origin and the vertebral basilar junction without stenosis. The right common carotid origin is normal in appearance. No stenosis seen in the right common carotid artery, its bifurcation into the right internal and external carotid arteries is within normal limits. No stenosis seen in the right internal carotid artery using the NASCET criteria.  CT angiogram of the head: CT angiogram images through the head demonstrate tiny diameter intracranial segment of the left vertebral artery that appears to be patent and gives off the left posterior inferior cerebellar artery and post PICA segments very tiny in diameter but patent to the vertebrobasilar junction dominant right vertebral artery is widely patent to the vertebrobasilar  junction, the basilar artery and basilar tips within normal limits. The posterior cerebral, superior cerebellar arteries are within normal limits. The upper cervical petrous cavernous and supracavernous segments of the internal carotid arteries are within normal limits. The anterior cerebral arteries are within normal limits. There is some nodular prominence at the level of the anterior communicating artery origin measuring 2.6 mm in size probably a junctional infundibulum rather than aneurysm. The M1 segments and middle cerebral arteries and middle cerebral artery trifurcations are within normal limits.  IMPRESSION: 1. 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 There is also a 2.5 x 1.8 x 2.2 cm densely calcified meningioma overlying the inferior lateral left frontal lobe and extending lateral to the left anterior frontotemporal operculum. 2. CT perfusion study demonstrates no convincing hypoperfused areas or acute completed infarction in the field of view in the brain. If there remains clinical suspicion of an acute infarct, I recommend an MRI of the brain to further evaluate. 3. 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 4. The left vertebral artery is suboptimally assessed and may be occluded from its origin to the C3 cervical level where there is an ascending cervical collateral that reconstitutes a diseased small caliber left vertebral artery from the C3 cervical  level to just above the C1 ring where there is another collateral that hooks into the back wall of the left vertebral artery just above the C1 ring and within the small diameter left vertebral artery is widely patent from C1 ring to its distal intracranial segment where there appears to be an additional moderate stenosis just proximal to the vertebrobasilar junction. There appears to be focal dilatation of the collateral branch just proximal to its reconstitution of the left vertebral artery at the level of inferior endplate of C3 where it goes from 1 to 2.5 mm in diameter best seen on axial CT images 130 through 132. This finding can be further assessed with an MRA of the neck. No additional stenosis is seen in the great vessels at the head or neck. Specifically, no stenosis is seen in the internal carotid arteries using the NASCET criteria. The final dictated report was communicated to Dr. Falk from stroke neurology by telephone on 01/28/2019 at 8:55 PM and also to Dr. Pineda in the emergency room 01/28/2019 at 9:30 PM.  Radiation dose reduction techniques were utilized, including automated exposure control and exposure modulation based on body size.  This report was finalized on 1/29/2019 10:16 AM by Dr. Ludin Stout M.D.      Ct Head Without Contrast    Result Date: 1/29/2019  CT OF THE HEAD WITHOUT CONTRAST  HISTORY: Stroke  COMPARISON: July 28, 2019  TECHNIQUE: Axial CT imaging was obtained from the vertex of the skull through the skull base. No IV contrast was administered.  FINDINGS: No IV contrast was administered for this examination, although the patient did receive contrast for prior stroke protocol. No obvious acute intracranial hemorrhage is identified, but the exam is degraded by the presence of intravenous contrast material. The ventricles are normal in size. Patient is noted to have a large calcified left frontal meningioma. This is not associated with any midline shift or mass effect. There is  some mild periventricular deep white matter microangiopathic change. There is an old left cerebellar infarct, as well as an area of decreased attenuation seen within the left basal ganglia, which is also favored to be chronic. No new areas of decreased attenuation are identified. There is no evidence of venous occlusion. Mucosal thickening is seen within the ethmoid sinuses. Mastoid air cells appear clear. No focal soft tissue abnormalities are seen.      No acute intracranial process identified. If symptoms persist, correlation with MRI suggested.  Radiation dose reduction techniques were utilized, including automated exposure control and exposure modulation based on body size.  This report was finalized on 1/29/2019 1:27 AM by Dr. Kadi Gomez M.D.      Ct Angiogram Neck With & Without Contrast    Result Date: 1/29/2019  EMERGENCY CONTRAST ENHANCED CT ANGIOGRAM OF THE HEAD AND NECK AND CT PERFUSION STUDY OF THE HEAD 01/28/2019  CLINICAL HISTORY: Acute onset slurred speech, right-sided weakness and facial droop.  Noncontrast head CT technique: Spiral CT images were obtained from the base of the skull to vertex without intravenous contrast and images were reformatted and submitted in 3 mm thick axial CT section with brain algorithm and 2 mm thick sagittal and coronal reconstructions were performed with brain algorithm.  FINDINGS: There are patchy areas of low density extending from the periventricular and subcortical white matter of the cerebral hemispheres consistent with mild/moderate small vessel disease. There is a 3 x 2 cm area of encephalomalacia compatible with an old infarct in posterior inferomedial left cerebellum and left PICA territory. The remainder of the brain parenchyma is normal in attenuation. The ventricles are normal in size. I see no midline shift. No extra-axial fluid collections are identified. There is densely calcified mass that is extra-axial and dural based overlying the inferior  lateral left frontal lobe and left frontotemporal operculum measuring 2.6 x 1.8 x 2.2 cm in anterior posterior and medial lateral craniocaudal dimension most consistent with a densely calcified meningioma. The paranasal sinuses and the mastoid air cells and middle ear cavities are clear.      1. 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 2. There is densely calcified 2.5 x 1.8 x 2.2 cm meningioma overlying the inferior lateral left frontal lobe and left frontotemporal operculum. The remainder of the head CT is normal. The results were communicated to Dr. Falk from stroke neurology on 01/28/2019 at 8:33 PM and he requested a CT angiogram of the head and neck and CT perfusion study of the head.  CT angiogram head and neck and CT perfusion study technique: Spiral CT images were obtained through the head during arterial phase of contrast and images were reformatted and analyzed with rapid software analysis for CT perfusion study of the head and subsequently spiral CT images were obtained from the top of the aortic arch up through the great vessels of the head and neck during arterial phase of contrast and images were reformatted and submitted in 1 mm thick axial sagittal and coronal CT sections with soft tissue algorithm and additional 3D reconstructions were performed to complete a CT angiogram of the head and neck and finally spiral CT images were obtained from the base of the skull to the vertex delayed following intravenous contrast and these images were reformatted and submitted in 3 mm thick axial CT section with brain algorithm.  CT perfusion study: The CT perfusion images demonstrate no areas of reduced cerebral blood flow to less than 30% with no convincing areas of acute completed infarction in the field of view. Furthermore there are no areas of delayed time to maximal enhancement of greater than 6  seconds with no convincing focal hypoperfused areas seen in the brain.  CT angiogram of the neck: The nasopharynx, oropharynx, hypopharynx, true cords and subglottic airway are normal in appearance. The lung apices are clear. The parotid  parapharyngeal and submandibular spaces are symmetric and normal in appearance. Cervical spondylosis is present, disc space narrowing and degenerative disc and endplate changes C3-C7, some posterior endplate spurring and uncovertebral joint hypertrophy with mild canal and mild/moderate right foraminal narrowing C3-4, mild canal and mild right foraminal narrowing C4-5, mild canal and foraminal narrowing at C5-6 and C6-7. There is abnormal enlargement of the right neural foramen at C2-3 with mass extending from the right lateral epidural space through the right neural foramen at C2-3 that measures approximately 14 x 14 x 16 mm in size likely a neurofibroma. There is limited evaluation of the great vessel origins off the aortic arch due to beam hardening artifact off densely opacified left brachiocephalic vein. There appears to be a moderate stenosis of the origin and proximal left subclavian artery due to noncalcified atherosclerotic plaque and the distal left subclavian artery is normal in appearance. The left vertebral artery is suboptimally visualized and appears to be likely occluded from its origin to just anterior to the C3-4 interspace level where there is an ascending cervical collateral that aids in reconstituting a tiny diameter diseased left vertebral artery from the C3 cervical level to just above the C1 ring where there is another collateral that hooks into the back wall the upper cervical left vertebral artery and then the left vertebral arteries is patent to the vertebrobasilar junction although there may be an additional moderate stenosis of the distal intracranial segment of the left vertebral artery just proximal to the vertebrobasilar junction. No  stenosis seen in the left common carotid artery, its bifurcation and left internal and external carotid arteries within normal limits and no stenosis is seen in the left internal carotid arteries using the NASCET criteria. The brachycephalic artery origin is normal in appearance. No stenosis seen in the brachycephalic artery, its bifurcation into the right common carotid artery and subclavian arteries are normal in appearance. No stenosis in the right clavian artery origin. The right vertebral artery origin is normal in appearance. The right vertebral artery is the dominant vertebral artery and is widely patent from its origin and the vertebral basilar junction without stenosis. The right common carotid origin is normal in appearance. No stenosis seen in the right common carotid artery, its bifurcation into the right internal and external carotid arteries is within normal limits. No stenosis seen in the right internal carotid artery using the NASCET criteria.  CT angiogram of the head: CT angiogram images through the head demonstrate tiny diameter intracranial segment of the left vertebral artery that appears to be patent and gives off the left posterior inferior cerebellar artery and post PICA segments very tiny in diameter but patent to the vertebrobasilar junction dominant right vertebral artery is widely patent to the vertebrobasilar junction, the basilar artery and basilar tips within normal limits. The posterior cerebral, superior cerebellar arteries are within normal limits. The upper cervical petrous cavernous and supracavernous segments of the internal carotid arteries are within normal limits. The anterior cerebral arteries are within normal limits. There is some nodular prominence at the level of the anterior communicating artery origin measuring 2.6 mm in size probably a junctional infundibulum rather than aneurysm. The M1 segments and middle cerebral arteries and middle cerebral artery trifurcations are  within normal limits.  IMPRESSION: 1. 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 There is also a 2.5 x 1.8 x 2.2 cm densely calcified meningioma overlying the inferior lateral left frontal lobe and extending lateral to the left anterior frontotemporal operculum. 2. CT perfusion study demonstrates no convincing hypoperfused areas or acute completed infarction in the field of view in the brain. If there remains clinical suspicion of an acute infarct, I recommend an MRI of the brain to further evaluate. 3. 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 4. The left vertebral artery is suboptimally assessed and may be occluded from its origin to the C3 cervical level where there is an ascending cervical collateral that reconstitutes a diseased small caliber left vertebral artery from the C3 cervical level to just above the C1 ring where there is another collateral that hooks into the back wall of the left vertebral artery just above the C1 ring and within the small diameter left vertebral artery is widely patent from C1 ring to its distal intracranial segment where there appears to be an additional moderate stenosis just proximal to the vertebrobasilar junction. There appears to be focal dilatation of the collateral branch just proximal to its reconstitution of the left vertebral artery at the level of inferior endplate of C3 where it goes from 1 to 2.5 mm in diameter best seen on  axial CT images 130 through 132. This finding can be further assessed with an MRA of the neck. No additional stenosis is seen in the great vessels at the head or neck. Specifically, no stenosis is seen in the internal carotid arteries using the NASCET criteria. The final dictated report was communicated to Dr. Falk from stroke neurology by telephone on 01/28/2019 at 8:55 PM and also to Dr. Pineda in the emergency room 01/28/2019 at 9:30 PM.  Radiation dose reduction techniques were utilized, including automated exposure control and exposure modulation based on body size.  This report was finalized on 1/29/2019 10:16 AM by Dr. Ludin Stout M.D.      Mri Brain Without Contrast    Addendum Date: 1/29/2019    Findings were relayed to the patient's nurse at 10:36 PM on January 29, 2019.  This report was finalized on 1/29/2019 10:37 PM by Dr. Kadi Gomez M.D.      Result Date: 1/29/2019  BRAIN MRI WITHOUT CONTRAST  HISTORY: Stroke  COMPARISON: None.  FINDINGS:  Multiplanar images of the head were obtained without gadolinium. Study is positive for an area of restricted diffusion within the posterior limb of the left internal capsule and left corona radiata. Maximum dimensions are 1.4 x 1.0 cm. No additional areas of restricted diffusion are seen. The patient does have an area of encephalomalacia within the left cerebellar hemisphere. There is no evidence of hemorrhagic transformation on gradient echo images. Patient's peripherally calcified left frontal meningioma is again noted. It measures up to 2.4 x 1.7 cm. There is diffuse cerebral atrophy, in keeping with the age of 74. There is periventricular and deep white matter microangiopathic change. There is no midline shift or mass effect. Mild mucosal thickening is seen within the ethmoid sinuses. Old lacunar infarct is seen within the left basal ganglia. Intracranial flow-voids appear intact      1. Study is positive for area of acute infarct within the posterior  limb of the left internal capsule and left corona radiata. There is no associated midline shift or mass effect. No additional areas of restricted diffusion are identified.  This report was finalized on 1/29/2019 9:21 PM by Dr. Kadi Gomez M.D.      Xr Chest 1 View    Result Date: 1/28/2019  PORTABLE CHEST RADIOGRAPH  HISTORY: Acute stroke protocol  COMPARISON: None available.  FINDINGS: Heart size is within normal limits for portable technique. There is tortuosity of the thoracic aorta. No pneumothorax or pleural effusion is seen. Patient is noted to have bibasilar atelectasis.      Bibasilar atelectasis.  This report was finalized on 1/28/2019 9:42 PM by Dr. Kadi Gomez M.D.      Xr Spine Lumbar 1 View    Result Date: 1/29/2019  XR SPINE LUMBAR 1 VW-  INDICATIONS:    Back pain  TECHNIQUE: A FRONTAL VIEW OF THE LUMBAR SPINE  COMPARISON: None available  FINDINGS:  The image is limited by overpenetration. S-shaped scoliosis of the thoracolumbar spine is seen. No obvious displaced fracture is noted on this single view. Multilevel endplate spurring is apparent. There appears to be disc space narrowing at L1-L3, although suboptimally characterized with this technique. Arterial calcifications are seen. Further evaluation can be obtained as indicated.       As described.  This report was finalized on 1/29/2019 2:37 PM by Dr. Jj Richardson M.D.      Fl Video Swallow    Result Date: 1/31/2019  VIDEO SWALLOW STUDY  HISTORY: Dysphagia.  3 minutes 20 seconds fluoroscopy was provided for the speech pathologist during a video swallow study. 4,069 images were saved.  Laryngeal penetration and aspiration were observed during the exam.      Fluoroscopy was provided for the speech pathologist during a video swallow study. For full details please see the speech pathology report.  This report was finalized on 1/31/2019 6:28 PM by Dr. Raf Uriarte M.D.      Ct Cerebral Perfusion With & Without Contrast    Result  Date: 1/29/2019  EMERGENCY CONTRAST ENHANCED CT ANGIOGRAM OF THE HEAD AND NECK AND CT PERFUSION STUDY OF THE HEAD 01/28/2019  CLINICAL HISTORY: Acute onset slurred speech, right-sided weakness and facial droop.  Noncontrast head CT technique: Spiral CT images were obtained from the base of the skull to vertex without intravenous contrast and images were reformatted and submitted in 3 mm thick axial CT section with brain algorithm and 2 mm thick sagittal and coronal reconstructions were performed with brain algorithm.  FINDINGS: There are patchy areas of low density extending from the periventricular and subcortical white matter of the cerebral hemispheres consistent with mild/moderate small vessel disease. There is a 3 x 2 cm area of encephalomalacia compatible with an old infarct in posterior inferomedial left cerebellum and left PICA territory. The remainder of the brain parenchyma is normal in attenuation. The ventricles are normal in size. I see no midline shift. No extra-axial fluid collections are identified. There is densely calcified mass that is extra-axial and dural based overlying the inferior lateral left frontal lobe and left frontotemporal operculum measuring 2.6 x 1.8 x 2.2 cm in anterior posterior and medial lateral craniocaudal dimension most consistent with a densely calcified meningioma. The paranasal sinuses and the mastoid air cells and middle ear cavities are clear.      1. 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 2. There is densely calcified 2.5 x 1.8 x 2.2 cm meningioma overlying the inferior lateral left frontal lobe and left frontotemporal operculum. The remainder of the head CT is normal. The results were communicated to Dr. Falk from stroke neurology on 01/28/2019 at 8:33 PM and he requested a CT angiogram of the head and neck and CT perfusion study of the head.  CT  angiogram head and neck and CT perfusion study technique: Spiral CT images were obtained through the head during arterial phase of contrast and images were reformatted and analyzed with rapid software analysis for CT perfusion study of the head and subsequently spiral CT images were obtained from the top of the aortic arch up through the great vessels of the head and neck during arterial phase of contrast and images were reformatted and submitted in 1 mm thick axial sagittal and coronal CT sections with soft tissue algorithm and additional 3D reconstructions were performed to complete a CT angiogram of the head and neck and finally spiral CT images were obtained from the base of the skull to the vertex delayed following intravenous contrast and these images were reformatted and submitted in 3 mm thick axial CT section with brain algorithm.  CT perfusion study: The CT perfusion images demonstrate no areas of reduced cerebral blood flow to less than 30% with no convincing areas of acute completed infarction in the field of view. Furthermore there are no areas of delayed time to maximal enhancement of greater than 6 seconds with no convincing focal hypoperfused areas seen in the brain.  CT angiogram of the neck: The nasopharynx, oropharynx, hypopharynx, true cords and subglottic airway are normal in appearance. The lung apices are clear. The parotid  parapharyngeal and submandibular spaces are symmetric and normal in appearance. Cervical spondylosis is present, disc space narrowing and degenerative disc and endplate changes C3-C7, some posterior endplate spurring and uncovertebral joint hypertrophy with mild canal and mild/moderate right foraminal narrowing C3-4, mild canal and mild right foraminal narrowing C4-5, mild canal and foraminal narrowing at C5-6 and C6-7. There is abnormal enlargement of the right neural foramen at C2-3 with mass extending from the right lateral epidural space through the right  neural foramen at C2-3 that measures approximately 14 x 14 x 16 mm in size likely a neurofibroma. There is limited evaluation of the great vessel origins off the aortic arch due to beam hardening artifact off densely opacified left brachiocephalic vein. There appears to be a moderate stenosis of the origin and proximal left subclavian artery due to noncalcified atherosclerotic plaque and the distal left subclavian artery is normal in appearance. The left vertebral artery is suboptimally visualized and appears to be likely occluded from its origin to just anterior to the C3-4 interspace level where there is an ascending cervical collateral that aids in reconstituting a tiny diameter diseased left vertebral artery from the C3 cervical level to just above the C1 ring where there is another collateral that hooks into the back wall the upper cervical left vertebral artery and then the left vertebral arteries is patent to the vertebrobasilar junction although there may be an additional moderate stenosis of the distal intracranial segment of the left vertebral artery just proximal to the vertebrobasilar junction. No stenosis seen in the left common carotid artery, its bifurcation and left internal and external carotid arteries within normal limits and no stenosis is seen in the left internal carotid arteries using the NASCET criteria. The brachycephalic artery origin is normal in appearance. No stenosis seen in the brachycephalic artery, its bifurcation into the right common carotid artery and subclavian arteries are normal in appearance. No stenosis in the right clavian artery origin. The right vertebral artery origin is normal in appearance. The right vertebral artery is the dominant vertebral artery and is widely patent from its origin and the vertebral basilar junction without stenosis. The right common carotid origin is normal in appearance. No stenosis seen in the right common carotid artery, its bifurcation into the  right internal and external carotid arteries is within normal limits. No stenosis seen in the right internal carotid artery using the NASCET criteria.  CT angiogram of the head: CT angiogram images through the head demonstrate tiny diameter intracranial segment of the left vertebral artery that appears to be patent and gives off the left posterior inferior cerebellar artery and post PICA segments very tiny in diameter but patent to the vertebrobasilar junction dominant right vertebral artery is widely patent to the vertebrobasilar junction, the basilar artery and basilar tips within normal limits. The posterior cerebral, superior cerebellar arteries are within normal limits. The upper cervical petrous cavernous and supracavernous segments of the internal carotid arteries are within normal limits. The anterior cerebral arteries are within normal limits. There is some nodular prominence at the level of the anterior communicating artery origin measuring 2.6 mm in size probably a junctional infundibulum rather than aneurysm. The M1 segments and middle cerebral arteries and middle cerebral artery trifurcations are within normal limits.  IMPRESSION: 1. 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 There is also a 2.5 x 1.8 x 2.2 cm densely calcified meningioma overlying the inferior lateral left frontal lobe and extending lateral to the left anterior frontotemporal operculum. 2. CT perfusion study demonstrates no convincing hypoperfused areas or acute completed infarction in the field of view in the brain. If there remains clinical suspicion of an acute infarct, I recommend an MRI of the brain to further evaluate. 3. 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 4. The left vertebral artery is suboptimally assessed and may be occluded from its origin to the C3 cervical level where there is an ascending cervical collateral that reconstitutes a diseased small caliber left vertebral artery from the C3 cervical level to just above the C1 ring where there is another collateral that hooks into the back wall of the left vertebral artery just above the C1 ring and within the small diameter left vertebral artery is widely patent from C1 ring to its distal intracranial segment where there appears to be an additional moderate stenosis just proximal to the vertebrobasilar junction. There appears to be focal dilatation of the collateral branch just proximal to its reconstitution of the left vertebral artery at the level of inferior endplate of C3 where it goes from 1 to 2.5 mm in diameter best seen on axial CT images 130 through 132. This finding can be further assessed with an MRA of the neck. No additional stenosis is seen in the great vessels at the head or neck. Specifically, no stenosis is seen in the internal carotid arteries using the NASCET criteria. The final dictated report was communicated to Dr. Falk from stroke neurology by telephone on 01/28/2019 at 8:55 PM and also to Dr. Pineda in the emergency room 01/28/2019 at 9:30 PM.  Radiation dose reduction techniques were utilized, including automated exposure control and exposure modulation based on body size.  This report was finalized on 1/29/2019 10:16 AM by Dr. Ludin Stout M.D.      Results for orders placed during the hospital encounter of 01/28/19   Adult Transthoracic Echo Complete W/ Cont if Necessary Per Protocol    Narrative · Left  ventricular systolic function is normal. Calculated EF = 65%.   Estimated EF was in agreement with the calculated EF. Estimated EF = 65%.   Normal left ventricular cavity size noted. All left ventricular wall   segments contract normally. Left ventricular wall thickness is consistent   with mild concentric hypertrophy. Left ventricular diastolic dysfunction   is noted (grade I) consistent with impaired relaxation.  · Normal left atrial volume noted. Saline test results are negative.  · Severe MAC is present. Mild mitral valve regurgitation is present.  · Trace tricuspid valve regurgitation is present. Estimated right   ventricular systolic pressure from tricuspid regurgitation is normal (<35   mmHg). Calculated right ventricular systolic pressure from tricuspid   regurgitation is 20 mmHg.              Active Hospital Problems    Diagnosis Date Noted   • Acute CVA (cerebrovascular accident) (CMS/[facility]) [I63.9] 01/28/2019      Resolved Hospital Problems   No resolved problems to display.         Assessment/Plan     1. Left internal capsule ischemic CVA status post TPA workup completed except she'll need outpatient rhythm monitoring( zio patch) and neurology recommends dual antiplatelet therapy aspirin and Plavix for 3 months and then single agent Lipitor 40 mg daily  2. Right hemiparesis secondary to #1  3. Oral pharyngeal dysphagia secondary to #1 on dysphagia diet  4.  hypertension blood pressure was remaining a little high we'll have to increase medications and gradually bring this down  5. Hypothyroidism on replacement  6. Incidental meningioma  7. 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  tobacco abuse needs to stop smoking.  I have discussed with her need to stop each day today she shook my hand an d promised she had quite now.    Plan for disposition: Patient wants to go home she doesn't meet criteria for subacute and cannot afford subacute rehabilitation, she is very rational alert and oriented with  myself and the nurse.  I just think that she and her son clashing on personal emotional levels.  If her blood pressure remains adequately controlled and she continues to improve that she can ambulate well and take care of herself at her go home tomorrow    Ady Gomez MD  02/05/19  5:21 PM    Time:

## 2019-02-05 NOTE — PLAN OF CARE
Problem: Patient Care Overview  Goal: Plan of Care Review  Outcome: Ongoing (interventions implemented as appropriate)   02/05/19 0839   Plan of Care Review   Progress improving   OTHER   Outcome Summary Pt tolerated treatment with no complaints. pt ambulated 300 feet with rwx, CGA. Pt is independent for bed mobility and standby assist with transfers.    Coping/Psychosocial   Plan of Care Reviewed With patient

## 2019-02-06 ENCOUNTER — APPOINTMENT (OUTPATIENT)
Dept: CARDIOLOGY | Facility: HOSPITAL | Age: 74
End: 2019-02-06

## 2019-02-06 VITALS
TEMPERATURE: 98.6 F | RESPIRATION RATE: 18 BRPM | HEART RATE: 74 BPM | WEIGHT: 141.09 LBS | OXYGEN SATURATION: 98 % | DIASTOLIC BLOOD PRESSURE: 85 MMHG | SYSTOLIC BLOOD PRESSURE: 125 MMHG | HEIGHT: 70 IN | BODY MASS INDEX: 20.2 KG/M2

## 2019-02-06 LAB
ANION GAP SERPL CALCULATED.3IONS-SCNC: 13.4 MMOL/L
BUN BLD-MCNC: 10 MG/DL (ref 8–23)
BUN/CREAT SERPL: 15.4 (ref 7–25)
CALCIUM SPEC-SCNC: 9.3 MG/DL (ref 8.6–10.5)
CHLORIDE SERPL-SCNC: 104 MMOL/L (ref 98–107)
CO2 SERPL-SCNC: 25.6 MMOL/L (ref 22–29)
CREAT BLD-MCNC: 0.65 MG/DL (ref 0.57–1)
GFR SERPL CREATININE-BSD FRML MDRD: 89 ML/MIN/1.73
GLUCOSE BLD-MCNC: 86 MG/DL (ref 65–99)
POTASSIUM BLD-SCNC: 3.7 MMOL/L (ref 3.5–5.2)
SODIUM BLD-SCNC: 143 MMOL/L (ref 136–145)

## 2019-02-06 PROCEDURE — 97535 SELF CARE MNGMENT TRAINING: CPT

## 2019-02-06 PROCEDURE — 80048 BASIC METABOLIC PNL TOTAL CA: CPT | Performed by: INTERNAL MEDICINE

## 2019-02-06 PROCEDURE — 0296T HC EXT ECG > 48HR TO 21 DAY RCRD W/CONECT INTL RCRD: CPT

## 2019-02-06 RX ORDER — ASPIRIN 81 MG/1
81 TABLET, CHEWABLE ORAL DAILY
Qty: 100 TABLET | Refills: 0 | Status: SHIPPED | OUTPATIENT
Start: 2019-02-07

## 2019-02-06 RX ORDER — NICOTINE 21 MG/24HR
1 PATCH, TRANSDERMAL 24 HOURS TRANSDERMAL
Qty: 21 PATCH | Refills: 0 | Status: SHIPPED | OUTPATIENT
Start: 2019-02-07

## 2019-02-06 RX ORDER — AMLODIPINE BESYLATE 10 MG/1
10 TABLET ORAL
Qty: 30 TABLET | Refills: 0 | Status: SHIPPED | OUTPATIENT
Start: 2019-02-07

## 2019-02-06 RX ORDER — ATORVASTATIN CALCIUM 40 MG/1
40 TABLET, FILM COATED ORAL NIGHTLY
Qty: 30 TABLET | Refills: 0 | Status: SHIPPED | OUTPATIENT
Start: 2019-02-06

## 2019-02-06 RX ORDER — CLOPIDOGREL BISULFATE 75 MG/1
75 TABLET ORAL DAILY
Qty: 30 TABLET | Refills: 0 | Status: SHIPPED | OUTPATIENT
Start: 2019-02-07

## 2019-02-06 RX ORDER — LISINOPRIL 5 MG/1
5 TABLET ORAL
Qty: 30 TABLET | Refills: 0 | Status: SHIPPED | OUTPATIENT
Start: 2019-02-07

## 2019-02-06 RX ORDER — CHOLECALCIFEROL (VITAMIN D3) 125 MCG
5 CAPSULE ORAL NIGHTLY
Qty: 30 TABLET | Refills: 0 | Status: SHIPPED | OUTPATIENT
Start: 2019-02-06

## 2019-02-06 RX ADMIN — Medication 1 TABLET: at 08:31

## 2019-02-06 RX ADMIN — Medication 1000 MCG: at 08:31

## 2019-02-06 RX ADMIN — NICOTINE 1 PATCH: 21 PATCH, EXTENDED RELEASE TRANSDERMAL at 08:31

## 2019-02-06 RX ADMIN — CLOPIDOGREL 75 MG: 75 TABLET, FILM COATED ORAL at 08:31

## 2019-02-06 RX ADMIN — AMLODIPINE BESYLATE 10 MG: 10 TABLET ORAL at 08:31

## 2019-02-06 RX ADMIN — LISINOPRIL 5 MG: 5 TABLET ORAL at 08:31

## 2019-02-06 RX ADMIN — ASPIRIN 81 MG: 81 TABLET, CHEWABLE ORAL at 08:31

## 2019-02-06 RX ADMIN — LEVOTHYROXINE SODIUM 125 MCG: 125 TABLET ORAL at 08:31

## 2019-02-06 NOTE — PLAN OF CARE
Problem: Fall Risk (Adult)  Goal: Absence of Fall  Outcome: Ongoing (interventions implemented as appropriate)      Problem: Skin Injury Risk (Adult)  Goal: Skin Health and Integrity  Outcome: Ongoing (interventions implemented as appropriate)      Problem: Patient Care Overview  Goal: Plan of Care Review  Outcome: Ongoing (interventions implemented as appropriate)   02/06/19 0596   Plan of Care Review   Progress improving   OTHER   Outcome Summary no c/o, worked with ot/pt, possible d/c, continue to monitor   Coping/Psychosocial   Plan of Care Reviewed With patient       Problem: Stroke (Ischemic) (Adult)  Goal: Signs and Symptoms of Listed Potential Problems Will be Absent, Minimized or Managed (Stroke)  Outcome: Ongoing (interventions implemented as appropriate)      Problem: Pain, Acute (Adult)  Goal: Acceptable Pain Control/Comfort Level  Outcome: Ongoing (interventions implemented as appropriate)

## 2019-02-06 NOTE — PLAN OF CARE
Problem: Fall Risk (Adult)  Goal: Absence of Fall  Outcome: Ongoing (interventions implemented as appropriate)      Problem: Skin Injury Risk (Adult)  Goal: Skin Health and Integrity  Outcome: Ongoing (interventions implemented as appropriate)      Problem: Patient Care Overview  Goal: Plan of Care Review  Outcome: Ongoing (interventions implemented as appropriate)   02/06/19 0432   Plan of Care Review   Progress improving   OTHER   Outcome Summary Pt ambulating without assistance. Anxious for discharge home. B/P remains elevated.    Coping/Psychosocial   Plan of Care Reviewed With patient     Goal: Individualization and Mutuality  Outcome: Ongoing (interventions implemented as appropriate)      Problem: Stroke (Ischemic) (Adult)  Goal: Signs and Symptoms of Listed Potential Problems Will be Absent, Minimized or Managed (Stroke)  Outcome: Ongoing (interventions implemented as appropriate)      Problem: Pain, Acute (Adult)  Goal: Acceptable Pain Control/Comfort Level  Outcome: Ongoing (interventions implemented as appropriate)      Problem: Dysphagia (Adult)  Goal: Functional/Safe Swallow  Outcome: Ongoing (interventions implemented as appropriate)    Goal: Compensatory Techniques to Improve Safety/Function with Swallowing  Outcome: Ongoing (interventions implemented as appropriate)

## 2019-02-06 NOTE — PROGRESS NOTES
Continued Stay Note  [facility]     Patient Name: Sommer Smith  MRN: [id number]  Today's Date: 2/6/2019    Admit Date: 1/28/2019    Discharge Plan     Row Name 02/06/19 1454       Plan    Plan  Plan is home with [facility]  upon DC.      Plan Comments  CCP spoke with [facility] who states that precert has been denied for rehab.  Pt and  her son Phillip notified.  They are in agreement with [facility]  for OT and nursing.   Message left for [facility]  intake to notify.   Appointment made with pt's PCP, Dr. Sylvie Schulz for 2/14/19 @ 1000.  Her office states Dr. Schulz will sign HH orders before pt's appointment.          Discharge Codes    No documentation.             Tere Devine RN

## 2019-02-06 NOTE — PLAN OF CARE
Problem: Patient Care Overview  Goal: Plan of Care Review   02/06/19 0852   Plan of Care Review   Progress improving   OTHER   Outcome Summary OT. Pt. is progressing with improved RUE function. Min impairment RUE gross/FM coordination. Encouraged increased RUE use at home for functional tasks and  strenghening strategies. Was able to don pants and sock seated EOB with SBA. Ambulated to sink with CGA to brush teeth. Pt. would benefit from OT home health eval at D/c for safety in home setting.    Coping/Psychosocial   Plan of Care Reviewed With patient

## 2019-02-06 NOTE — DISCHARGE SUMMARY
Date of Discharge:  2/6/2019    Discharge Diagnoses:  1.  Left internal capsule ischemic CVA  2. Right hemiparesis secondary to #1  3. Oral pharyngeal dysphagia secondary #1  4. Hypertension  5. Hypothyroidism  6. Incidental meningioma  7. 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 Tobacco abuse      Hospital Course  Patient is a 74 y.o. female presented with with the symptoms of stroke with some right hemiparesis and dysphagia she was found to have a left internal capsule ischemic CVA she has done remarkably well with therapy she has had almost complete recovery she does have hypertension and took quite a bit of medication to get that under control she was incidentally found to have an old left putamen infarct and a meningioma.  She'll need to follow-up with the neurology in a couple of months to follow-up with her primary care in about 1 week to check her blood pressure.  She'll need her liver functions followed with her high-dose statins.  We have counseled her extensively on smoking cessation she has promised me she stopped she'll go home with a nicotine patch 21 mg and that dose may be decreased by her primary care physician as the patient tolerates.  She is to get a Zio patch for rhythm monitoring at discharge and that will need to be followed up with her primary care physician as well.      Procedures Performed         Consults:   Consults     Date and Time Order Name Status Description    1/31/2019 0838 Inpatient Internal Medicine Consult      1/28/2019 2055 Pulmonology (on-call MD unless specified) Completed     1/28/2019 2026 Inpatient Neurology Consult Stroke      1/28/2019 2026 Inpatient Neurology Consult Stroke Completed     1/28/2019 2024 Inpatient Neurology Consult Stroke Completed     1/28/2019 2024 Inpatient Neurology Consult Stroke Completed           Pertinent Test Results:   Labs:  Results from last 7 days   Lab Units 02/06/19  0508 01/31/19  0353   GLUCOSE mg/dL 86 101*    SODIUM mmol/L 143 139   POTASSIUM mmol/L 3.7 3.6   CO2 mmol/L 25.6 20.5*   CHLORIDE mmol/L 104 105   ANION GAP mmol/L 13.4 13.5   CREATININE mg/dL 0.65 0.55*   BUN mg/dL 10 7*   BUN / CREAT RATIO  15.4 12.7   CALCIUM mg/dL 9.3 8.6   EGFR IF NONAFRICN AM mL/min/1.73 89 108     Estimated Creatinine Clearance: 62.3 mL/min (by C-G formula based on SCr of 0.65 mg/dL).                                    Imaging Results (last 72 hours)     ** No results found for the last 72 hours. **        Results for orders placed during the hospital encounter of 01/28/19   Adult Transthoracic Echo Complete W/ Cont if Necessary Per Protocol    Narrative · Left ventricular systolic function is normal. Calculated EF = 65%.   Estimated EF was in agreement with the calculated EF. Estimated EF = 65%.   Normal left ventricular cavity size noted. All left ventricular wall   segments contract normally. Left ventricular wall thickness is consistent   with mild concentric hypertrophy. Left ventricular diastolic dysfunction   is noted (grade I) consistent with impaired relaxation.  · Normal left atrial volume noted. Saline test results are negative.  · Severe MAC is present. Mild mitral valve regurgitation is present.  · Trace tricuspid valve regurgitation is present. Estimated right   ventricular systolic pressure from tricuspid regurgitation is normal (<35   mmHg). Calculated right ventricular systolic pressure from tricuspid   regurgitation is 20 mmHg.              Condition on Discharge:  Dramatically improved    Vital Signs  Temp:  [97.6 °F (36.4 °C)-98.6 °F (37 °C)] 98.6 °F (37 °C)  Heart Rate:  [64-87] 74  Resp:  [18] 18  BP: (125-171)/() 125/85    Physical Exam:    General Appearance: Well-developed elderly white female sitting up in a chair in no acute distress she still a little hard of hearing   Eyes: Conjunctiva are clear and anicteric pupils are equal  ENT: Mucous membranes are moist no erythema or exudates don't appreciate  any facial asymmetry  Neck: Trachea Midline no palpable adenopathy or thyromegaly  Lungs: Clear nonlabored symmetric expansion  Cardiac: Regular rate and rhythm no murmur  Abdomen: Soft no palpable organomegaly or masses  : Not examined  Musculoskeletal: Grossly normal  Skin: No jaundice, no rashes, no petechiae  Neuro: She is alert oriented cooperative following commands well she has good  both right and left in pretty good dorsiflexion and plantarflexion and she is ambulating with a very steady gait  Extremities/P Vascular: No clubbing no cyanosis no edema palpable radial dorsalis pedis pulses bilaterally  MSE: Seems to be in very good spirits very pleasant engaged.          Discharge Disposition  Home or Self Care    Discharge Medications     Discharge Medications      New Medications      Instructions Start Date   amLODIPine 10 MG tablet  Commonly known as:  NORVASC   10 mg, Oral, Every 24 Hours Scheduled      aspirin 81 MG chewable tablet   81 mg, Oral, Daily      atorvastatin 40 MG tablet  Commonly known as:  LIPITOR   40 mg, Oral, Nightly      clopidogrel 75 MG tablet  Commonly known as:  PLAVIX   75 mg, Oral, Daily      cyanocobalamin 1000 MCG tablet  Commonly known as:  VITAMIN B-12   1,000 mcg, Oral, Daily      lisinopril 5 MG tablet  Commonly known as:  PRINIVIL,ZESTRIL   5 mg, Oral, Every 24 Hours Scheduled      melatonin 5 MG tablet tablet   5 mg, Oral, Nightly      nicotine 21 MG/24HR patch  Commonly known as:  NICODERM CQ   1 patch, Transdermal, Every 24 Hours Scheduled         Continue These Medications      Instructions Start Date   levothyroxine 125 MCG tablet  Commonly known as:  SYNTHROID, LEVOTHROID   Oral, Daily             Discharge Diet:  healthy heart    Activity at Discharge:     Follow-up Appointments  No future appointments.      Test Results Pending at Discharge       Ady Gomez MD  02/06/19  5:55 PM    Time:

## 2019-02-06 NOTE — DISCHARGE INSTR - APPOINTMENTS
Appointment with Dr. Sylvie Schulz on Thursday Feb 14, 2019 at 10:00AM (Lab draw to check thyroid labs and appointment with Dr. Schulz).  Please call her office if you need to cancel or reschedule [phone].

## 2019-02-06 NOTE — THERAPY TREATMENT NOTE
Acute Care - Occupational Therapy Treatment Note  [facility]     Patient Name: Sommer Smith  : 1945  MRN: [id number]  Today's Date: 2019        Referring Physician: Blossom    Admit Date: 2019       ICD-10-CM ICD-9-CM   1. Acute CVA (cerebrovascular accident) (CMS/HCC) I63.9 434.91   2. Decreased mobility R26.89 781.99     Patient Active Problem List   Diagnosis   • Acute CVA (cerebrovascular accident) (CMS/HCC)     Past Medical History:   Diagnosis Date   • Disease of thyroid gland      Past Surgical History:   Procedure Laterality Date   • BACK SURGERY     •  SECTION     • TONSILLECTOMY         Therapy Treatment    Rehabilitation Treatment Summary     Row Name 1938             Treatment Time/Intention    Discipline  occupational therapist  -CW      Document Type  therapy note (daily note)  -CW      Subjective Information  complains of;weakness  -CW      Mode of Treatment  occupational therapy  -CW      Patient/Family Observations  Pt. awake supine bed level  -CW      Patient Effort  good  -CW      Existing Precautions/Restrictions  fall  -CW      Recorded by [CW] Bhakti Akbar OTR 19 0850      Row Name 1938             Vital Signs    O2 Delivery Pre Treatment  room air  -CW      O2 Delivery Post Treatment  room air  -CW      Recorded by [CW] Bhakti Akbar OTR 1950      Row Name 1938             Cognitive Assessment/Intervention- PT/OT    Follows Commands (Cognition)  follows one step commands;over 90% accuracy Is [place]  -CW      Safety Deficit (Cognitive)  mild deficit;awareness of need for assistance;safety precautions awareness  -CW      Personal Safety Interventions  fall prevention program maintained  -CW      Recorded by [CW] Bhakti Akbar OTR 1950      Row Name 1938             Bed Mobility Assessment/Treatment    Supine-Sit [place] (Bed Mobility)  independent  -CW      Sit-Supine [place] (Bed Mobility)   independent  -CW      Recorded by [CW] Bhakti Akbar OTR 02/06/19 0850      Row Name 02/06/19 0838             Sit-Stand Transfer    Sit-Stand [place] (Transfers)  supervision  -CW      Assistive Device (Sit-Stand Transfers)  walker, front-wheeled  -CW      Recorded by [CW] Bhakti Akbar OTR 02/06/19 0850      Row Name 02/06/19 0838             Stand-Sit Transfer    Stand-Sit [place] (Transfers)  stand by assist  -CW      Assistive Device (Stand-Sit Transfers)  walker, front-wheeled  -CW      Recorded by [CW] Bhakti Akbar OTR 02/06/19 0850      Row Name 02/06/19 0838             ADL Assessment/Intervention    BADL Assessment/Intervention  lower body dressing;grooming  -CW      Recorded by [CW] Bhakti Akbar OTR 02/06/19 0850      Row Name 02/06/19 0838             Lower Body Dressing Assessment/Training    Lower Body Dressing [place] Level  lower body dressing skills;doff;don;pants/bottoms;socks;set up;supervision  -CW      Lower Body Dressing Position  edge of bed sitting  -CW      Recorded by [CW] Bhakti Akbar OTR 02/06/19 0850      Row Name 02/06/19 0838             Grooming Assessment/Training    [place] Level (Grooming)  grooming skills;hair care, combing/brushing;oral care regimen;set up;contact guard assist  -CW      Grooming Position  edge of bed sitting;supported standing  -CW      Comment (Grooming)  CGA to stand at sink for oral hygiene. Grooming EOB with set up  -CW      Recorded by [CW] Bhakti Akbar OTR 02/06/19 0850      Row Name 02/06/19 0838             Motor Skills Assessment/Interventions    Additional Documentation  Fine Motor Testing & Training (Group);Motor Coordination Assessment/Training (Group);Neuromuscular Re-education (Group)  -CW      Recorded by [CW] Bhakti Akbar OTR 02/06/19 0850      Row Name 02/06/19 0838             Therapeutic Exercise    Upper Extremity Range of Motion (Therapeutic Exercise)  shoulder flexion/extension, right;elbow flexion/extension,  right;forearm supination/pronation, right;wrist flexion/extension, right  -CW      Hand (Therapeutic Exercise)  finger flexion/extension, right;thumb finger opposition, right;hand , right  -CW      Exercise Type (Therapeutic Exercise)  AROM (active range of motion) Active reaching RUE  -CW      Position (Therapeutic Exercise)  seated  -CW      Expected Outcome (Therapeutic Exercise)  facilitate normal movement patterns;improve performance, BADLs  -CW      Recorded by [CW] Bhakti Akbar OTR 02/06/19 0850      Row Name 02/06/19 0838             Static Standing Balance    Level of [place] (Supported Standing, Static Balance)  contact guard assist  -CW      Time Able to Maintain Position (Supported Standing, Static Balance)  45 to 60 seconds  -CW      Assistive Device Utilized (Supported Standing, Static Balance)  walker, rolling  -CW      Recorded by [CW] Bhakti Akbar OTR 02/06/19 0850      Row Name 02/06/19 0838             Motor Coordination Assessment/Training    Comment, Fine Motor Coordination Training  Min impaired FM/GM coordination R UE/hand.   -CW      Recorded by [CW] Bhakti Akbar OTR 02/06/19 0850      Row Name 02/06/19 0838             Positioning and Restraints    Pre-Treatment Position  in bed  -CW      Post Treatment Position  bed  -CW      In Bed  sitting EOB;call light within reach;encouraged to call for assist;exit alarm on  -CW      Recorded by [CW] Bhakti Akbar OTR 02/06/19 0850      Row Name 02/06/19 0838             Pain Scale: Numbers Pre/Post-Treatment    Pain Scale: Numbers, Pretreatment  0/10 - no pain  -CW      Pain Scale: Numbers, Post-Treatment  0/10 - no pain  -CW      Recorded by [CW] Bhakti Akbar OTR 02/06/19 0850      Row Name 02/06/19 0838             Outcome Summary/Treatment Plan (OT)    Daily Summary of Progress (OT)  progress toward functional goals is good  -CW      Recorded by [CW] Bhakti Akbar OTR 02/06/19 0850        User Key  (r) = Recorded By, (t) = Taken  By, (c) = Cosigned By    Initials Name Effective Dates Discipline    CW Bhakti Akbar, OTR 06/08/18 -  OT             Occupational Therapy Education     Title: PT OT SLP Therapies (Done)     Topic: Occupational Therapy (Done)     Point: ADL training (Done)     Description: Instruct learner(s) on proper safety adaptation and remediation techniques during self care or transfers.   Instruct in proper use of assistive devices.    Learning Progress Summary           Patient Acceptance, E, VU by  at 2/6/2019  8:51 AM    Comment:  Reviewed safety at home with functional mobility. Encouraged increased use of RUE and FM tasks including  strength.    Acceptance, E,TB,D, DU,NR by  at 2/1/2019  4:54 AM                   Point: Home exercise program (Done)     Description: Instruct learner(s) on appropriate technique for monitoring, assisting and/or progressing therapeutic exercises/activities.    Learning Progress Summary           Patient Acceptance, E, VU by ADEOLA at 2/6/2019  8:51 AM    Comment:  Reviewed safety at home with functional mobility. Encouraged increased use of RUE and FM tasks including  strength.    Acceptance, E,TB,D, DU,NR by  at 2/1/2019  4:54 AM                   Point: Precautions (Done)     Description: Instruct learner(s) on prescribed precautions during self-care and functional transfers.    Learning Progress Summary           Patient Acceptance, E, VU by  at 2/6/2019  8:51 AM    Comment:  Reviewed safety at home with functional mobility. Encouraged increased use of RUE and FM tasks including  strength.    Acceptance, E,TB,D, DU,NR by  at 2/1/2019  4:54 AM    Toneer, E, VU by ADRIANNE at 1/31/2019  2:56 PM    Comment:  Ed role of OT in acute care. Ed body mechanics and hand placement during xfers. DIscuss goals and plan of care.                   Point: Body mechanics (Done)     Description: Instruct learner(s) on proper positioning and spine alignment during self-care, functional mobility  activities and/or exercises.    Learning Progress Summary           Patient Acceptance, REG, ROYCE by ADEOLA at 2/6/2019  8:51 AM    Comment:  Reviewed safety at home with functional mobility. Encouraged increased use of RUE and FM tasks including  strength.    Acceptance, E,TB,D, DU,NR by  at 2/1/2019  4:54 AM    REG Gupta VU by ADRIANNE at 1/31/2019  2:56 PM    Comment:  Ed role of OT in acute care. Ed body mechanics and hand placement during xfers. DIscuss goals and plan of care.                               User Key     Initials Effective Dates Name Provider Type Discipline    ADRIANNE 06/08/18 -  Jasmin Sol, OTR Occupational Therapist OT    ADEOLA 06/08/18 -  Bhakti Akbar OTR Occupational Therapist OT     04/06/17 -  Mary Sampson, CARSON Registered Nurse Nurse                OT Recommendation and Plan  Outcome Summary/Treatment Plan (OT)  Daily Summary of Progress (OT): progress toward functional goals is good  Daily Summary of Progress (OT): progress toward functional goals is good  Plan of Care Review  Plan of Care Reviewed With: patient  Plan of Care Reviewed With: patient  Outcome Summary: OT. Pt. is progressing with improved RUE function. Min impairment RUE gross/FM coordination. Encouraged increased RUE use at home for functional tasks and  strenghening strategies. Was able to don pants and sock seated EOB with SBA. Ambulated to sink with CGA to brush teeth. Pt. would benefit from OT home health eval at D/c.    Outcome Measures     Row Name 02/06/19 0800 02/04/19 1100 02/03/19 1000       How much help from another person do you currently need...    Turning from your back to your side while in flat bed without using bedrails?  --  4  -SM  4  -LC    Moving from lying on back to sitting on the side of a flat bed without bedrails?  --  4  -SM  4  -LC    Moving to and from a bed to a chair (including a wheelchair)?  --  3  -SM  3  -LC    Standing up from a chair using your arms (e.g., wheelchair, bedside chair)?  --  3   -  3  -LC    Climbing 3-5 steps with a railing?  --  2  -SM  2  -LC    To walk in hospital room?  --  3  -SM  3  -LC    AM-PAC 6 Clicks Score  --  19  -SM  19  -LC       How much help from another is currently needed...    Putting on and taking off regular lower body clothing?  3  -CW  --  --    Bathing (including washing, rinsing, and drying)  3  -CW  --  --    Toileting (which includes using toilet bed pan or urinal)  3  -CW  --  --    Putting on and taking off regular upper body clothing  3  -CW  --  --    Taking care of personal grooming (such as brushing teeth)  3  -CW  --  --    Eating meals  3  -CW  --  --    Score  18  -CW  --  --       Functional Assessment    Outcome Measure Options  AM-PAC 6 Clicks Daily Activity (OT)  -CW  AM-PAC 6 Clicks Basic Mobility (PT)  -  AM-PAC 6 Clicks Basic Mobility (PT)  -      User Key  (r) = Recorded By, (t) = Taken By, (c) = Cosigned By    Initials Name Provider Type    Bhakti Anne OTR Occupational Therapist    Sylvie Hernandez, PTA Physical Therapy Assistant    Camron Manning, PT DPT Physical Therapist           Time Calculation:   Time Calculation- OT     Row Name 02/06/19 0857             Time Calculation- OT    OT Start Time  0800  -CW      OT Stop Time  0817  -CW      OT Time Calculation (min)  17 min  -CW      Total Timed Code Minutes- OT  17 minute(s)  -CW        User Key  (r) = Recorded By, (t) = Taken By, (c) = Cosigned By    Initials Name Provider Type    Bhakti Anne OTR Occupational Therapist           Therapy Suggested Charges     Code   Minutes Charges    None           Therapy Charges for Today     Code Description Service Date Service Provider Modifiers Qty    [id number] HC OT SELF CARE/MGMT/TRAIN EA 15 MIN 2/6/2019 Bhakti Akbar OTR GO 1               MONE Najera  2/6/2019

## 2019-02-07 ENCOUNTER — READMISSION MANAGEMENT (OUTPATIENT)
Dept: CALL CENTER | Facility: HOSPITAL | Age: 74
End: 2019-02-07

## 2019-02-07 NOTE — PROGRESS NOTES
Case Management Discharge Note    Final Note: Pt DC'd home with her son and [facility]    Destination      No service has been selected for the patient.      Durable Medical Equipment      No service has been selected for the patient.      Dialysis/Infusion      No service has been selected for the patient.      Home Medical Care - Selection Complete      Service Provider Request Status Selected Services Address Phone Number Fax Number    [facility] Selected Home Health Services [address] [address] [phone]       Melissa Linares RN 2/5/2019 1653    ms to [facility] intake to follow.                  Community Resources      No service has been selected for the patient.             Final Discharge Disposition Code: 06 - home with home health care([facility])

## 2019-02-07 NOTE — OUTREACH NOTE
Prep Survey      Responses   Facility patient discharged from?  [facility]   Is patient eligible?  Yes   Discharge diagnosis  Left internal capsule ischemic CVA, right hemiparesis secondary ot CVA, oral pharyngeal dysphagea, HTN, Hypothyroidism, incidental meningioma, incidental old left putamen lucanar type infart, tobacco abuse.    Does the patient have one of the following disease processes/diagnoses(primary or secondary)?  Stroke (TIA)   Does the patient have Home health ordered?  Yes   What is the Home health agency?   [facility]   Is there a DME ordered?  No   Comments regarding appointments  See AVS   Prep survey completed?  Yes          Nadja Kincaid RN

## 2019-02-08 ENCOUNTER — READMISSION MANAGEMENT (OUTPATIENT)
Dept: CALL CENTER | Facility: HOSPITAL | Age: 74
End: 2019-02-08

## 2019-02-08 NOTE — OUTREACH NOTE
Stroke Week 1 Survey      Responses   Facility patient discharged from?  [facility]   Does the patient have one of the following disease processes/diagnoses(primary or secondary)?  Stroke (TIA)   Is there a successful TCM telephone encounter documented?  No   Week 1 attempt successful?  No   Unsuccessful attempts  Attempt 1          Navya Miles RN

## 2019-02-11 ENCOUNTER — READMISSION MANAGEMENT (OUTPATIENT)
Dept: CALL CENTER | Facility: HOSPITAL | Age: 74
End: 2019-02-11

## 2019-02-11 NOTE — OUTREACH NOTE
Stroke Week 1 Survey      Responses   Facility patient discharged from?  [facility]   Does the patient have one of the following disease processes/diagnoses(primary or secondary)?  Stroke (TIA)   Is there a successful TCM telephone encounter documented?  No   Week 1 attempt successful?  Yes   Call start time  1650   Call end time  1656   Discharge diagnosis  Left internal capsule ischemic CVA, right hemiparesis secondary ot CVA, oral pharyngeal dysphagea, HTN, Hypothyroidism, incidental meningioma, incidental old left putamen lucanar type infart, tobacco abuse.    Meds reviewed with patient/caregiver?  Yes   Is the patient having any side effects they believe may be caused by any medication additions or changes?  No   Does the patient have all medications ordered at discharge?  Yes   Is the patient taking all medications as directed (includes completed medication regime)?  Yes   Does the patient have a primary care provider?   Yes   Does the patient have an appointment with their PCP within 7 days of discharge?  Yes   Has the patient kept scheduled appointments due by today?  Yes   What is the Home health agency?   [facility]   Has home health visited the patient within 72 hours of discharge?  Call prior to 72 hours   Psychosocial issues?  No   Does the patient require any assistance with activities of daily living such as eating, bathing, dressing, walking, etc.?  Yes   Does the patient have any residual symptoms from stroke/TIA?  Yes   Does the patient understand the diet ordered at discharge?  Yes   Did the patient receive a copy of their discharge instructions?  Yes   Nursing interventions  Reviewed instructions with patient   What is the patient's perception of their health status since discharge?  Improving   Nursing interventions  Nurse provided patient education   Is the patient able to teach back FAST for Stroke?  Yes   Is the patient/caregiver able to teach back the risk factors for a stroke?  Diabetes, Smoking,  High blood pressure-goal below 120/80, High Cholesterol, Physical inactivity and obesity   Is the patient/caregiver able to teach back signs and symptoms related to disease process for when to call PCP?  Yes   Is the patient/caregiver able to teach back signs and symptoms related to disease process for when to call 911?  Yes   If the patient is a current smoker, are they able to teach back resources for cessation?  Smoking cessation medications   Is the patient/caregiver able to teach back the hierarchy of who to call/visit for symptoms/problems? PCP, Specialist, Home health nurse, Urgent Care, ED, 911  Yes   Week 1 call completed?  Yes          Peng Callejas RN

## 2019-02-20 ENCOUNTER — TELEPHONE (OUTPATIENT)
Dept: NEUROLOGY | Facility: CLINIC | Age: 74
End: 2019-02-20

## 2019-02-20 ENCOUNTER — READMISSION MANAGEMENT (OUTPATIENT)
Dept: CALL CENTER | Facility: HOSPITAL | Age: 74
End: 2019-02-20

## 2019-02-20 NOTE — TELEPHONE ENCOUNTER
Two Week Stroke Phone Call  Spoke with the patient  · Admission Date: 1/28/2019  · Discharge Date: 2/6/2019  · Discharge Destination: Home  · Meds reviewed with patient/caregiver?    [x]Yes [] No   o Antiplatelet: Aspirin, Plavix  o Understands purpose     [x]  Yes     []  No     - Understands how to take      [x]  Yes     []  No    o Cholesterol Reducing: Lipitor  - Understands purpose     [x]  Yes     []  No    - Understands how to take      [x]  Yes     []  No   · Is the patient taking all medication as directed?   [x]  Yes  []  No  · Discussed personal risk factors   [x]  Yes []  No    o Smoking or other tobacco use  - Has attempted to quit smoking [x]  Yes     []  No   o High blood pressure   - Reviewed medications ordered for high blood pressure  - Has been monitoring BP [x]  Yes     []  No  - Bp goal <130/80   o High cholesterol   - Review desired LDL goal <70  • Discussed signs and symptoms of stroke and when patient to call 911?   [x]  Yes []  No  o Sudden weakness or numbness of the face, arm, or leg especially on one side of the body  o Sudden confusion, trouble speaking or understanding  o Sudden trouble seeing in one or both eyes   o Sudden trouble walking, dizziness, loss of balance or coordination  o Sudden severe headaches with no known cause    Notified Patient that if any of these symptoms occur to call 911  · Does the patient have any new signs or symptoms of a stroke?   []  Yes     [x]  No  · Does the patietnt have an appointment with PCP?  [x]  Yes     []  No  · Does the patient have 3 month Stroke Clinic appointment?  Office will call to schedule   · Is the patient currently in therapy, outpatient, or home health?  [x]  Yes     []  No    Needs a referral?      []  Yes     [x]  No  Patient Satisfaction   · How often were you kept up to date with your plan of care?    []Never  [x] Sometimes  [] Usually  [] Always  · When test were ordered how often did someone explain to you the results?    []   Never  [x] Sometimes  [] Usually  [] Always  · What suggestions does the patient have of ways to improve the care to stroke patients?  No suggestions  · Overall how satisfied were you with the stroke care you received at [facility]?   []  Very Dissatisfied [] Dissatisfied   [] Satisfied [x] Very Satisfied

## 2019-02-20 NOTE — OUTREACH NOTE
Stroke Week 2 Survey      Responses   Facility patient discharged from?  [place]   Does the patient have one of the following disease processes/diagnoses(primary or secondary)?  Stroke (TIA)   Week 2 attempt successful?  No   Unsuccessful attempts  Attempt 1          Amanda Miles RN

## 2019-02-21 ENCOUNTER — READMISSION MANAGEMENT (OUTPATIENT)
Dept: CALL CENTER | Facility: HOSPITAL | Age: 74
End: 2019-02-21

## 2019-02-21 NOTE — OUTREACH NOTE
Stroke Week 2 Survey      Responses   Facility patient discharged from?  [facility]   Does the patient have one of the following disease processes/diagnoses(primary or secondary)?  Stroke (TIA)   Week 2 attempt successful?  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    Is patient permission given to speak with other caregiver?  Yes   List who call center can speak with  son or dtr   Meds reviewed with patient/caregiver?  Yes   Is the patient having any side effects they believe may be caused by any medication additions or changes?  No   Does the patient have all medications ordered at discharge?  Yes   Is the patient taking all medications as directed (includes completed medication regime)?  Yes   Has the patient kept scheduled appointments due by today?  Yes   What is the Home health agency?   [facility]   Has home health visited the patient within 72 hours of discharge?  Yes   Does the patient require any assistance with activities of daily living such as eating, bathing, dressing, walking, etc.?  No   Does the patient have any residual symptoms from stroke/TIA?  Yes   Residual symptoms comments  Pt is very careful with swallowing, clearing throat frequently, lethargic, gait unsteady, using walker for ambulation. Speech still slurring.    Does the patient understand the diet ordered at discharge?  Yes   What is the patient's perception of their health status since discharge?  Improving   Nursing interventions  Nurse provided patient education   Is the patient able to teach back FAST for Stroke?  Yes   Is the patient/caregiver able to teach back the risk factors for a stroke?  High blood pressure-goal below 120/80, Smoking, History of Afib, History of TIAs, Diabetes   Is the  patient/caregiver able to teach back signs and symptoms related to disease process for when to call PCP?  Yes   Is the patient/caregiver able to teach back signs and symptoms related to disease process for when to call 911?  Yes   Is the patient/caregiver able to teach back the hierarchy of who to call/visit for symptoms/problems? PCP, Specialist, Home health nurse, Urgent Care, ED, 911  Yes   Week 2 call completed?  Yes          Flaca Trujillo RN

## 2019-02-27 PROCEDURE — 0298T HOLTER MONITOR - 72 HOUR UP TO 21 DAY: CPT | Performed by: INTERNAL MEDICINE

## 2019-02-28 ENCOUNTER — READMISSION MANAGEMENT (OUTPATIENT)
Dept: CALL CENTER | Facility: HOSPITAL | Age: 74
End: 2019-02-28

## 2019-02-28 ENCOUNTER — TELEPHONE (OUTPATIENT)
Dept: NEUROLOGY | Facility: CLINIC | Age: 74
End: 2019-02-28

## 2019-02-28 DIAGNOSIS — I47.29 NSVT (NONSUSTAINED VENTRICULAR TACHYCARDIA) (HCC): Primary | ICD-10-CM

## 2019-02-28 NOTE — TELEPHONE ENCOUNTER
----- Message from VIVIAN Garcia sent at 2/28/2019  3:06 PM EST -----  Please let patient know that her ziopatch did not show the rhythm that causes stroke but it did show another abnormal heart rhythm episode (19 beat VT) that should be evaluated by cardiology. Does she have a cardiologist? If not I'll refer her to Dr Jama.

## 2019-02-28 NOTE — OUTREACH NOTE
Stroke Week 3 Survey      Responses   Facility patient discharged from?  [facility]   Does the patient have one of the following disease processes/diagnoses(primary or secondary)?  Stroke (TIA)   Week 3 attempt successful?  No   Unsuccessful attempts  Attempt 1          Alida Still RN

## 2019-02-28 NOTE — TELEPHONE ENCOUNTER
Spoke with the patient and notified her of the heart monitor results.   The patient does not have a cardiologist.  Please place a referral order.  Thankyou

## 2019-03-04 ENCOUNTER — READMISSION MANAGEMENT (OUTPATIENT)
Dept: CALL CENTER | Facility: HOSPITAL | Age: 74
End: 2019-03-04

## 2019-03-04 NOTE — OUTREACH NOTE
Stroke Week 1 Survey      Responses   Facility patient discharged from?  [place]   Does the patient have one of the following disease processes/diagnoses(primary or secondary)?  Stroke (TIA)          Emma Alvarado RN

## 2019-04-22 ENCOUNTER — CALL CENTER PROGRAMS (OUTPATIENT)
Dept: CALL CENTER | Facility: HOSPITAL | Age: 74
End: 2019-04-22

## 2019-04-22 NOTE — OUTREACH NOTE
Stroke [place] Survey      Responses   Facility patient discharged from?  [facility]   Attempt successful  Yes   Call start time  1142   Person spoke with today (if not patient) and relationship  Patient   Call end time  1147   Patient location 30 days post discharge if known  Home   Was the patient readmitted within 30 days of discharge?  No   Could you live alone without any help from another person?  Yes   Can you do everything that you were doing right before your stroke even if slower and not as much?  Yes   Are you completely back to the way you were right before your stroke?  Yes   Can you walk from one room to another without help from another person?  Yes   Can the patient sit up in bed without any help?  Yes   Call Center [place] Score  0   [place] score call completed  Yes   Comments  Patient tires easliy at the end of the day.  She reports no residual stroke symptoms.  She is able to drive, manage all her own house cleaning, shopping and ADLs,          Zita Luis RN